# Patient Record
Sex: MALE | Race: WHITE | Employment: OTHER | URBAN - METROPOLITAN AREA
[De-identification: names, ages, dates, MRNs, and addresses within clinical notes are randomized per-mention and may not be internally consistent; named-entity substitution may affect disease eponyms.]

---

## 2018-02-16 RX ORDER — METFORMIN HYDROCHLORIDE 500 MG/1
1000 TABLET ORAL 2 TIMES DAILY
COMMUNITY

## 2018-02-16 RX ORDER — CHOLECALCIFEROL (VITAMIN D3) 125 MCG
5000 CAPSULE ORAL DAILY
COMMUNITY

## 2018-02-19 ENCOUNTER — ANESTHESIA EVENT (OUTPATIENT)
Dept: ENDOSCOPY | Age: 68
End: 2018-02-19
Payer: MEDICARE

## 2018-02-19 ENCOUNTER — ANESTHESIA (OUTPATIENT)
Dept: ENDOSCOPY | Age: 68
End: 2018-02-19
Payer: MEDICARE

## 2018-02-19 ENCOUNTER — HOSPITAL ENCOUNTER (OUTPATIENT)
Age: 68
Setting detail: OUTPATIENT SURGERY
Discharge: HOME | End: 2018-02-19
Attending: INTERNAL MEDICINE | Admitting: INTERNAL MEDICINE
Payer: MEDICARE

## 2018-02-19 VITALS
WEIGHT: 230 LBS | SYSTOLIC BLOOD PRESSURE: 127 MMHG | HEART RATE: 69 BPM | DIASTOLIC BLOOD PRESSURE: 82 MMHG | OXYGEN SATURATION: 96 % | HEIGHT: 73 IN | TEMPERATURE: 96 F | BODY MASS INDEX: 30.48 KG/M2 | RESPIRATION RATE: 20 BRPM

## 2018-02-19 PROCEDURE — 77030009426 HC FCPS BIOP ENDOSC BSC -B: Performed by: INTERNAL MEDICINE

## 2018-02-19 PROCEDURE — 88305 TISSUE EXAM BY PATHOLOGIST: CPT | Performed by: INTERNAL MEDICINE

## 2018-02-19 PROCEDURE — 77030027957 HC TBNG IRR ENDOGTR BUSS -B: Performed by: INTERNAL MEDICINE

## 2018-02-19 PROCEDURE — 76060000031 HC ANESTHESIA FIRST 0.5 HR: Performed by: INTERNAL MEDICINE

## 2018-02-19 PROCEDURE — 77030011640 HC PAD GRND REM COVD -A: Performed by: INTERNAL MEDICINE

## 2018-02-19 PROCEDURE — 74011250636 HC RX REV CODE- 250/636: Performed by: INTERNAL MEDICINE

## 2018-02-19 PROCEDURE — 74011000250 HC RX REV CODE- 250

## 2018-02-19 PROCEDURE — 74011250636 HC RX REV CODE- 250/636

## 2018-02-19 PROCEDURE — 76040000019: Performed by: INTERNAL MEDICINE

## 2018-02-19 RX ORDER — DEXTROMETHORPHAN/PSEUDOEPHED 2.5-7.5/.8
1.2 DROPS ORAL
Status: DISCONTINUED | OUTPATIENT
Start: 2018-02-19 | End: 2018-02-19 | Stop reason: HOSPADM

## 2018-02-19 RX ORDER — PROPOFOL 10 MG/ML
INJECTION, EMULSION INTRAVENOUS AS NEEDED
Status: DISCONTINUED | OUTPATIENT
Start: 2018-02-19 | End: 2018-02-19 | Stop reason: HOSPADM

## 2018-02-19 RX ORDER — NALOXONE HYDROCHLORIDE 0.4 MG/ML
0.4 INJECTION, SOLUTION INTRAMUSCULAR; INTRAVENOUS; SUBCUTANEOUS
Status: DISCONTINUED | OUTPATIENT
Start: 2018-02-19 | End: 2018-02-19 | Stop reason: HOSPADM

## 2018-02-19 RX ORDER — MIDAZOLAM HYDROCHLORIDE 1 MG/ML
.25-1 INJECTION, SOLUTION INTRAMUSCULAR; INTRAVENOUS
Status: DISCONTINUED | OUTPATIENT
Start: 2018-02-19 | End: 2018-02-19 | Stop reason: HOSPADM

## 2018-02-19 RX ORDER — SODIUM CHLORIDE 0.9 % (FLUSH) 0.9 %
5-10 SYRINGE (ML) INJECTION AS NEEDED
Status: DISCONTINUED | OUTPATIENT
Start: 2018-02-19 | End: 2018-02-19 | Stop reason: HOSPADM

## 2018-02-19 RX ORDER — EPINEPHRINE 0.1 MG/ML
1 INJECTION INTRACARDIAC; INTRAVENOUS
Status: DISCONTINUED | OUTPATIENT
Start: 2018-02-19 | End: 2018-02-19 | Stop reason: HOSPADM

## 2018-02-19 RX ORDER — LIDOCAINE HYDROCHLORIDE 20 MG/ML
INJECTION, SOLUTION EPIDURAL; INFILTRATION; INTRACAUDAL; PERINEURAL AS NEEDED
Status: DISCONTINUED | OUTPATIENT
Start: 2018-02-19 | End: 2018-02-19 | Stop reason: HOSPADM

## 2018-02-19 RX ORDER — ATROPINE SULFATE 0.1 MG/ML
0.5 INJECTION INTRAVENOUS
Status: DISCONTINUED | OUTPATIENT
Start: 2018-02-19 | End: 2018-02-19 | Stop reason: HOSPADM

## 2018-02-19 RX ORDER — SODIUM CHLORIDE 9 MG/ML
INJECTION, SOLUTION INTRAVENOUS
Status: DISCONTINUED | OUTPATIENT
Start: 2018-02-19 | End: 2018-02-19 | Stop reason: HOSPADM

## 2018-02-19 RX ORDER — FENTANYL CITRATE 50 UG/ML
200 INJECTION, SOLUTION INTRAMUSCULAR; INTRAVENOUS
Status: DISCONTINUED | OUTPATIENT
Start: 2018-02-19 | End: 2018-02-19 | Stop reason: HOSPADM

## 2018-02-19 RX ORDER — FLUMAZENIL 0.1 MG/ML
0.2 INJECTION INTRAVENOUS
Status: DISCONTINUED | OUTPATIENT
Start: 2018-02-19 | End: 2018-02-19 | Stop reason: HOSPADM

## 2018-02-19 RX ORDER — SODIUM CHLORIDE 0.9 % (FLUSH) 0.9 %
5-10 SYRINGE (ML) INJECTION EVERY 8 HOURS
Status: DISCONTINUED | OUTPATIENT
Start: 2018-02-19 | End: 2018-02-19 | Stop reason: HOSPADM

## 2018-02-19 RX ORDER — SODIUM CHLORIDE 9 MG/ML
100 INJECTION, SOLUTION INTRAVENOUS CONTINUOUS
Status: DISCONTINUED | OUTPATIENT
Start: 2018-02-19 | End: 2018-02-19 | Stop reason: HOSPADM

## 2018-02-19 RX ADMIN — PROPOFOL 40 MG: 10 INJECTION, EMULSION INTRAVENOUS at 11:12

## 2018-02-19 RX ADMIN — LIDOCAINE HYDROCHLORIDE 100 MG: 20 INJECTION, SOLUTION EPIDURAL; INFILTRATION; INTRACAUDAL; PERINEURAL at 11:09

## 2018-02-19 RX ADMIN — PROPOFOL 50 MG: 10 INJECTION, EMULSION INTRAVENOUS at 11:25

## 2018-02-19 RX ADMIN — SODIUM CHLORIDE: 9 INJECTION, SOLUTION INTRAVENOUS at 11:07

## 2018-02-19 RX ADMIN — PROPOFOL 30 MG: 10 INJECTION, EMULSION INTRAVENOUS at 11:20

## 2018-02-19 RX ADMIN — PROPOFOL 80 MG: 10 INJECTION, EMULSION INTRAVENOUS at 11:09

## 2018-02-19 RX ADMIN — PROPOFOL 40 MG: 10 INJECTION, EMULSION INTRAVENOUS at 11:15

## 2018-02-19 RX ADMIN — PROPOFOL 10 MG: 10 INJECTION, EMULSION INTRAVENOUS at 11:22

## 2018-02-19 NOTE — ANESTHESIA POSTPROCEDURE EVALUATION
Post-Anesthesia Evaluation and Assessment    Patient: Fabienne Kaplan MRN: [id number]  SSN: [id number]    YOB: 1950  Age: 79 y.o. Sex: male       Cardiovascular Function/Vital Signs  Visit Vitals    /82    Pulse 69    Temp 35.6 °C (96 °F)    Resp 20    Ht 6' 1\" (1.854 m)    Wt 104.3 kg (230 lb)    SpO2 96%    BMI 30.34 kg/m2       Patient is status post MAC anesthesia for Procedure(s):  COLONOSCOPY  ENDOSCOPIC POLYPECTOMY. Nausea/Vomiting: None    Postoperative hydration reviewed and adequate. Pain:  Pain Scale 1: Numeric (0 - 10) (02/19/18 1152)  Pain Intensity 1: 0 (02/19/18 1152)   Managed    Neurological Status: At baseline    Mental Status and Level of Consciousness: Arousable    Pulmonary Status:   O2 Device: Room air (02/19/18 1152)   Adequate oxygenation and airway patent    Complications related to anesthesia: None    Post-anesthesia assessment completed.  No concerns    Signed By: Fuentes Pretty MD     February 19, 2018

## 2018-02-19 NOTE — ANESTHESIA PREPROCEDURE EVALUATION
Anesthetic History   No history of anesthetic complications            Review of Systems / Medical History  Patient summary reviewed, nursing notes reviewed and pertinent labs reviewed    Pulmonary          Smoker         Neuro/Psych   Within defined limits           Cardiovascular  Within defined limits                     GI/Hepatic/Renal  Within defined limits              Endo/Other    Diabetes         Other Findings            Physical Exam    Airway  Mallampati: II  TM Distance: > 6 cm  Neck ROM: normal range of motion   Mouth opening: Normal     Cardiovascular  Regular rate and rhythm,  S1 and S2 normal,  no murmur, click, rub, or gallop             Dental  No notable dental hx       Pulmonary  Breath sounds clear to auscultation               Abdominal  GI exam deferred       Other Findings            Anesthetic Plan    ASA: 2  Anesthesia type: MAC          Induction: Intravenous  Anesthetic plan and risks discussed with: Patient

## 2018-02-19 NOTE — H&P
[address]   [facility], [address] [place] [address]      History and Physical       NAME:  Shalini Kemp   :   1950   MRN:   [id number]             History of Present Illness:  Patient is a 79 y.o. who is seen for screening colonoscopy. PMH:  History reviewed. No pertinent past medical history. PSH:  Past Surgical History:   Procedure Laterality Date    HX APPENDECTOMY      HX TONSILLECTOMY         Allergies:  No Known Allergies    Home Medications:  Prior to Admission Medications   Prescriptions Last Dose Informant Patient Reported? Taking? ASPIRIN PO 2018  Yes Yes   Sig: Take 81 mg by mouth daily. DOCOSAHEXANOIC ACID/EPA (FISH OIL PO) 2018  Yes Yes   Sig: Take 2,400 mg by mouth two (2) times a day. OTHER 2018  Yes Yes   Sig: Methyl B12 600 mcg po once daily. RED YEAST RICE PO 2018  Yes Yes   Sig: Take 900 mg by mouth daily. cholecalciferol (VITAMIN D3) 5,000 unit capsule 2018 at Unknown time  Yes Yes   Sig: Take 5,000 Units by mouth daily. metFORMIN (GLUCOPHAGE) 500 mg tablet 2018  Yes Yes   Sig: Take 1,000 mg by mouth two (2) times a day.       Facility-Administered Medications: None       Hospital Medications:  Current Facility-Administered Medications   Medication Dose Route Frequency    0.9% sodium chloride infusion  100 mL/hr IntraVENous CONTINUOUS    sodium chloride (NS) flush 5-10 mL  5-10 mL IntraVENous Q8H    sodium chloride (NS) flush 5-10 mL  5-10 mL IntraVENous PRN    midazolam (VERSED) injection 0.25-10 mg  0.25-10 mg IntraVENous Multiple    fentaNYL citrate (PF) injection 200 mcg  200 mcg IntraVENous Multiple    naloxone (NARCAN) injection 0.4 mg  0.4 mg IntraVENous Multiple    flumazenil (ROMAZICON) 0.1 mg/mL injection 0.2 mg  0.2 mg IntraVENous Multiple    simethicone (MYLICON) 70MN/5.0YI oral drops 80 mg  1.2 mL Oral Multiple    atropine injection 0.5 mg  0.5 mg IntraVENous ONCE PRN    EPINEPHrine (ADRENALIN) 0.1 mg/mL syringe 1 mg  1 mg Endoscopically ONCE PRN       Social History:  Social History   Substance Use Topics    Smoking status: Current Some Day Smoker    Smokeless tobacco: Never Used      Comment: occasional cigar    Alcohol use No       Family History:  History reviewed. No pertinent family history. Review of Systems:      Constitutional: negative fever, negative chills, negative weight loss  Eyes:   negative visual changes  ENT:   negative sore throat, tongue or lip swelling  Respiratory:  negative cough, negative dyspnea  Cards:  negative for chest pain, palpitations, lower extremity edema  GI:   See HPI  :  negative for frequency, dysuria  Integument:  negative for rash and pruritus  Heme:  negative for easy bruising and gum/nose bleeding  Musculoskel: negative for myalgias,  back pain and muscle weakness  Neuro: negative for headaches, dizziness, vertigo  Psych:  negative for feelings of anxiety, depression       Objective:   Patient Vitals for the past 8 hrs:   BP Pulse Resp SpO2 Height Weight   02/19/18 0936 118/78 76 16 98 % 6' 1\" (1.854 m) 104.3 kg (230 lb)             EXAM:     NEURO-a&o   HEENT-wnl   LUNGS-clear    COR-regular rate and rhythym     ABD-soft , no tenderness, no rebound, good bs     EXT-no edema     Data Review     No results for input(s): WBC, HGB, HCT, PLT, HGBEXT, HCTEXT, PLTEXT in the last 72 hours. No results for input(s): NA, K, CL, CO2, BUN, CREA, GLU, PHOS, CA in the last 72 hours. No results for input(s): SGOT, GPT, AP, TBIL, TP, ALB, GLOB, GGT, AML, LPSE in the last 72 hours. No lab exists for component: AMYP, HLPSE  No results for input(s): INR, PTP, APTT in the last 72 hours. No lab exists for component: INREXT       Assessment:   · screening colonoscopy   There is no problem list on file for this patient.           Plan:   · Endoscopic procedure with sedation     Signed By: Cassandra Frye MD     2/19/2018  11:06 AM

## 2018-02-19 NOTE — PROCEDURES
[address]  [address] [facility], [address] [place] [address]      Colonoscopy Operative Report    Gladis Elias  [id number]  1950      Procedure Type:   Colonoscopy with polypectomy (hot biopsy)     Indications:    Screening colonoscopy   Date of last colonoscopy: 10 years ago, Polyps  No    Pre-operative Diagnosis: see indication above    Post-operative Diagnosis:  See findings below    :  Leighton Can MD      Referring Provider: Batsheva Hernandez MD      Sedation:  MAC anesthesia Propofol      Procedure Details:  After informed consent was obtained with all risks and benefits of procedure explained and preoperative exam completed, the patient was taken to the endoscopy suite and placed in the left lateral decubitus position. Upon sequential sedation as per above, a digital rectal exam was performed demonstrating internal hemorrhoids. The Olympus videocolonoscope  was inserted in the rectum and carefully advanced to the cecum, which was identified by the ileocecal valve and appendiceal orifice. The cecum was identified by the ileocecal valve and appendiceal orifice. The quality of preparation was good. The colonoscope was slowly withdrawn with careful evaluation between folds. Retroflexion in the rectum was completed . Findings:   Rectum: normal  Sigmoid: 8 mm sessile polyp removed by hot biopsy  Descending Colon: normal  Transverse Colon: normal  9 mm sessile polyp removed by hot biopsy  Ascending Colon: normal  Cecum: normal  Mild diverticulosis seen throughout the colon, including the cecum      Specimen Removed:  none    Complications: None. EBL:  None. Impression:    see findings    Recommendations: --Repeat colonoscopy in 5 vs 10  Years depending on the polyp's pathology.       High fiber diet    Signed By: Leighton Can MD     2/19/2018  11:31 AM

## 2018-02-19 NOTE — IP AVS SNAPSHOT
[address] [facility] [address] [place] [address] 
[phone] Patient: Amee Meza MRN: [id number] AOX:92/88/1067 About your hospitalization You were admitted on:  February 19, 2018 You last received care in the:  [facility] ENDOSCOPY You were discharged on:  February 19, 2018 Why you were hospitalized Your primary diagnosis was:  Not on File Follow-up Information None Discharge Orders None A check crystal indicates which time of day the medication should be taken. My Medications CONTINUE taking these medications Instructions Each Dose to Equal  
 Morning Noon Evening Bedtime ASPIRIN PO Your last dose was: Your next dose is: Take 81 mg by mouth daily. 81 mg  
    
   
   
   
  
 cholecalciferol 5,000 unit capsule Commonly known as:  VITAMIN D3 Your last dose was: Your next dose is: Take 5,000 Units by mouth daily. 5000 Units FISH OIL PO Your last dose was: Your next dose is: Take 2,400 mg by mouth two (2) times a day. 2400 mg  
    
   
   
   
  
 metFORMIN 500 mg tablet Commonly known as:  GLUCOPHAGE Your last dose was: Your next dose is: Take 1,000 mg by mouth two (2) times a day. 1000 mg  
    
   
   
   
  
 OTHER Your last dose was: Your next dose is: Methyl B12 600 mcg po once daily. RED YEAST RICE PO Your last dose was: Your next dose is: Take 900 mg by mouth daily. 900 mg Discharge Instructions [address] 
[address], [address] COLON DISCHARGE INSTRUCTIONS Amee Meza 
[id number] 
1950 Discomfort: 
Redness at IV site- apply warm compress to area; if redness or soreness persist- contact your physician There may be a slight amount of blood passed from the rectum Gaseous discomfort- walking, belching will help relieve any discomfort You may not operate a vehicle for 12 hours You may not engage in an occupation involving machinery or appliances for rest of today You may not drink alcoholic beverages for at least 12 hours Avoid making any critical decisions for at least 24 hour DIET: 
You may resume your regular diet  however -  remember your colon is empty and a heavy meal will produce gas. Avoid these foods:  vegetables, fried / greasy foods, carbonated drinks ACTIVITY: 
You may  resume your normal daily activities it is recommended that you spend the remainder of the day resting -  avoid any strenuous activity. CALL M.D. ANY SIGN OF: Increasing pain, nausea, vomiting Abdominal distension (swelling) New increased bleeding (oral or rectal) Fever (chills) Pain in chest area Shortness of breath Follow-up Instructions: 
 Call Dr. Phuong Arredondo for any questions or problems at [phone] High fiber diet ENDOSCOPY FINDINGS: 
 Your colonoscopy showed  2 small polyps I removed, and mild diverticulosis. Telephone # [phone] Signed By: Phuong Arredondo MD   
 2/19/2018  11:34 AM 
  
 
DISCHARGE SUMMARY from Nurse The following personal items collected during your admission are returned to you:  
Dental Appliance:   
Vision: Visual Aid: Glasses Hearing Aid:   
Jewelry:   
Clothing:   
Other Valuables:   
Valuables sent to safe:   
 
 
 
  
  
  
Introducing hospitals & HEALTH SERVICES! Sharon Nguyen introduces [employer] patient portal. Now you can access parts of your medical record, email your doctor's office, and request medication refills online. 1. In your internet browser, go to [email or web address] 2. Click on the First Time User? Click Here link in the Sign In box. You will see the New Member Sign Up page. 3. Enter your Sailthru Access Code exactly as it appears below. You will not need to use this code after youve completed the sign-up process. If you do not sign up before the expiration date, you must request a new code. · Sailthru Access Code: [email or web address] Expires: 5/20/2018 11:45 AM 
 
4. Enter the last four digits of your Social Security Number (xxxx) and Date of Birth (mm/dd/yyyy) as indicated and click Submit. You will be taken to the next sign-up page. 5. Create a WinningAdvantaget ID. This will be your Sailthru login ID and cannot be changed, so think of one that is secure and easy to remember. 6. Create a Sailthru password. You can change your password at any time. 7. Enter your Password Reset Question and Answer. This can be used at a later time if you forget your password. 8. Enter your e-mail address. You will receive e-mail notification when new information is available in [address] 9. Click Sign Up. You can now view and download portions of your medical record. 10. Click the Download Summary menu link to download a portable copy of your medical information. If you have questions, please visit the Frequently Asked Questions section of the Sailthru website. Remember, Sailthru is NOT to be used for urgent needs. For medical emergencies, dial 911. Now available from your iPhone and Android! Providers Seen During Your Hospitalization Provider Specialty Primary office phone Selene Harris MD Gastroenterology [phone] Your Primary Care Physician (PCP) Primary Care Physician Office Phone Office Fax Kyree Devonte [phone] You are allergic to the following No active allergies Recent Documentation Height Weight BMI Smoking Status 1.854 m 104.3 kg 30.34 kg/m2 Current Some Day Smoker Emergency Contacts Name Discharge Info Relation Home Work Mobile Boile 78 CAREGIVER [3] Spouse [3] [phone] Patient Belongings The following personal items are in your possession at time of discharge: 
     Visual Aid: Glasses Please provide this summary of care documentation to your next provider. Signatures-by signing, you are acknowledging that this After Visit Summary has been reviewed with you and you have received a copy. Patient Signature:  ____________________________________________________________ Date:  ____________________________________________________________  
  
Haleigh Sleeper Provider Signature:  ____________________________________________________________ Date:  ____________________________________________________________

## 2018-02-19 NOTE — ROUTINE PROCESS
[facility]  1950  [id number]    Situation:  Verbal report received from: Yasmeen RN  Procedure: Procedure(s):  COLONOSCOPY  ENDOSCOPIC POLYPECTOMY    Background:    Preoperative diagnosis: POLYPS  Postoperative diagnosis: diverticulosis, colon polyps    :  Dr. Tess Montez  Assistant(s): Endoscopy Technician-1: Wilfredo Or  Endoscopy RN-1: Alondra Pemberton RN    Specimens:   ID Type Source Tests Collected by Time Destination   1 : transverse colon polyp Preservative   Lynn Crump MD 2/19/2018 1122 Pathology   2 : sigmoid colon polyp Preservative   Sotero Garner MD 2/19/2018 1125 Pathology     H. Pylori  no    Assessment:  Intra-procedure medications   Anesthesia gave intra-procedure sedation and medications, see anesthesia flow sheet yes    Intravenous fluids: NS@ KVO     Vital signs stable     Abdominal assessment: round and soft     Recommendation:  Discharge patient per MD order.     Family or Friend   Permission to share finding with family or friend yes

## 2018-02-19 NOTE — DISCHARGE INSTRUCTIONS
[facility]    [place] DISCHARGE INSTRUCTIONS    Corona Paris  [id number]  1950    Discomfort:  Redness at IV site- apply warm compress to area; if redness or soreness persist- contact your physician  There may be a slight amount of blood passed from the rectum  Gaseous discomfort- walking, belching will help relieve any discomfort  You may not operate a vehicle for 12 hours  You may not engage in an occupation involving machinery or appliances for rest of today  You may not drink alcoholic beverages for at least 12 hours  Avoid making any critical decisions for at least 24 hour  DIET:  You may resume your regular diet - however -  remember your colon is empty and a heavy meal will produce gas. Avoid these foods:  vegetables, fried / greasy foods, carbonated drinks     ACTIVITY:  You may  resume your normal daily activities it is recommended that you spend the remainder of the day resting -  avoid any strenuous activity. CALL M.D. ANY SIGN OF:   Increasing pain, nausea, vomiting  Abdominal distension (swelling)  New increased bleeding (oral or rectal)  Fever (chills)  Pain in chest area    Shortness of breath      Follow-up Instructions:   Call Dr. Jass Oreilly for any questions or problems at [phone]  High fiber diet          ENDOSCOPY FINDINGS:   Your colonoscopy showed  2 small polyps I removed, and mild diverticulosis.   Telephone # [phone]      Signed By: Jass Oreilly MD     2/19/2018  11:34 AM       DISCHARGE SUMMARY from Nurse    The following personal items collected during your admission are returned to you:   Dental Appliance:    Vision: Visual Aid: Glasses  Hearing Aid:    Jewelry:    Clothing:    Other Valuables:    Valuables sent to safe:

## 2022-10-10 ENCOUNTER — OFFICE VISIT (OUTPATIENT)
Dept: ORTHOPEDIC SURGERY | Age: 72
End: 2022-10-10
Payer: MEDICARE

## 2022-10-10 VITALS — WEIGHT: 215 LBS | HEIGHT: 72 IN | BODY MASS INDEX: 29.12 KG/M2

## 2022-10-10 DIAGNOSIS — M17.0 PRIMARY OSTEOARTHRITIS OF BOTH KNEES: ICD-10-CM

## 2022-10-10 DIAGNOSIS — M25.561 ACUTE PAIN OF RIGHT KNEE: Primary | ICD-10-CM

## 2022-10-10 PROCEDURE — 99203 OFFICE O/P NEW LOW 30 MIN: CPT | Performed by: ORTHOPAEDIC SURGERY

## 2022-10-10 PROCEDURE — G8536 NO DOC ELDER MAL SCRN: HCPCS | Performed by: ORTHOPAEDIC SURGERY

## 2022-10-10 PROCEDURE — 1123F ACP DISCUSS/DSCN MKR DOCD: CPT | Performed by: ORTHOPAEDIC SURGERY

## 2022-10-10 PROCEDURE — 1101F PT FALLS ASSESS-DOCD LE1/YR: CPT | Performed by: ORTHOPAEDIC SURGERY

## 2022-10-10 PROCEDURE — 3017F COLORECTAL CA SCREEN DOC REV: CPT | Performed by: ORTHOPAEDIC SURGERY

## 2022-10-10 PROCEDURE — G8419 CALC BMI OUT NRM PARAM NOF/U: HCPCS | Performed by: ORTHOPAEDIC SURGERY

## 2022-10-10 PROCEDURE — G8427 DOCREV CUR MEDS BY ELIG CLIN: HCPCS | Performed by: ORTHOPAEDIC SURGERY

## 2022-10-10 PROCEDURE — G8432 DEP SCR NOT DOC, RNG: HCPCS | Performed by: ORTHOPAEDIC SURGERY

## 2022-10-10 NOTE — PROGRESS NOTES
Marielle Sargent (: 1950) is a 70 y.o. male, patient, here for evaluation of the following chief complaint(s):  Knee Pain (Right knee pain)       HPI:    Patient presents for evaluation of his right knee pain. States that pain is been present for the last couple years. Primary care provider diagnosed with right knee osteoarthritis. She underwent a corticosteroid injection approximately 3 weeks ago. He states that his pain has resolved significantly since that injection. Feels like his primary concern right now is an abnormality within his gait. Feels like his right leg is slightly longer than his left. States that after walking for prolonged periods he has medial sided knee pain that forces him to sit. No Known Allergies    Current Outpatient Medications   Medication Sig    metFORMIN (GLUCOPHAGE) 500 mg tablet Take 1,000 mg by mouth two (2) times a day. RED YEAST RICE PO Take 900 mg by mouth daily. ASPIRIN PO Take 81 mg by mouth daily. cholecalciferol (VITAMIN D3) 5,000 unit capsule Take 5,000 Units by mouth daily. OTHER Methyl B12 600 mcg po once daily. DOCOSAHEXANOIC ACID/EPA (FISH OIL PO) Take 2,400 mg by mouth two (2) times a day. No current facility-administered medications for this visit. No past medical history on file. Past Surgical History:   Procedure Laterality Date    COLONOSCOPY N/A 2018    COLONOSCOPY performed by Ernie Watson MD at [facility] ENDOSCOPY    HX APPENDECTOMY      HX TONSILLECTOMY         No family history on file.      Social History     Socioeconomic History    Marital status:      Spouse name: Not on file    Number of children: Not on file    Years of education: Not on file    Highest education level: Not on file   Occupational History    Not on file   Tobacco Use    Smoking status: Some Days    Smokeless tobacco: Never    Tobacco comments:     occasional cigar   Substance and Sexual Activity    Alcohol use: No    Drug use: Not on file    Sexual activity: Not on file   Other Topics Concern    Not on file   Social History Narrative    Not on file     Social Determinants of Health     Financial Resource Strain: Not on file   Food Insecurity: Not on file   Transportation Needs: Not on file   Physical Activity: Not on file   Stress: Not on file   Social Connections: Not on file   Intimate Partner Violence: Not on file   Housing Stability: Not on file       Review of Systems   Musculoskeletal:         Right knee pain       Vitals: There were no vitals taken for this visit. There is no height or weight on file to calculate BMI. Ortho Exam     General: No acute distress, alert and oriented x3. Right knee: Mild varus deformity noted with the knee. There is small effusion within the knee. No ecchymosis, erythema throughout the knee. Range of motion from 0 to 120 degrees. Tenderness palpation of the medial and lateral joint lines. Crepitance with extension of the knee. Stable to varus and valgus stress. Negative Lachman, negative posterior drawer. José's maneuver is negative. Distally the extremity is neurovascular intact. Left knee: There is no effusion noted. There is no deformity, ecchymosis, erythema or abrasions about the knee. There is pain-free range of motion from 0 to 130 degrees. No crepitance is noted, patient is able to straight leg raise. There is no tenderness to palpation at the medial or lateral joint lines. No tenderness to palpation along the extensor mechanism. Negative José's exam.  There is a negative Lachman's exam, negative anterior and posterior drawer tests. Knee is stable to varus and valgus stress at 0 and 30 degrees. Distally the extremity is neurovascularly intact. X-rays of the right knee demonstrate osteoarthritic change with bone-on-bone arthritis on the medial compartment. No evidence of fracture or dislocation.         XR Results (most recent):  Results from Appointment encounter on 10/10/22    XR KNEE RT MIN 4 V    Narrative  4 view x-ray of both knees reveals significant osteoarthritis of both knees with medial compartment bone-on-bone arthritis. ASSESSMENT/PLAN:    Patient is osteoarthritic right knee that is causing both his pain as well as the abnormality within his gait. He is starting to progress into his touch of varus causing his gait abnormality as well. His primary care provider is appropriately been treating him with corticosteroid injections that have provided him significant relief. I think this is reasonable to continue doing. We discussed definitive management for his issue would be total knee replacement. He does not wish to discuss surgical options at this time given the function of his corticosteroid injections. Happy to see him back on an as-needed basis regarding his right knee.         Annalee Sharp MD

## 2022-10-10 NOTE — LETTER
10/10/2022    Patient: Charly Shi   YOB: 1950   Date of Visit: 10/10/2022     Darshan Soto MD  [address] [place] [id number]  Via Fax: [phone]    Dear Darshan Soto MD,      Thank you for referring Mr. Flor Luna to [facility] for evaluation. My notes for this consultation are attached. If you have questions, please do not hesitate to call me. I look forward to following your patient along with you.       Sincerely,    Ignacio Reagan MD

## 2023-09-26 ENCOUNTER — HOSPITAL ENCOUNTER (OUTPATIENT)
Facility: HOSPITAL | Age: 73
Setting detail: OUTPATIENT SURGERY
Discharge: HOME | End: 2023-09-26
Attending: INTERNAL MEDICINE | Admitting: INTERNAL MEDICINE
Payer: MEDICARE

## 2023-09-26 ENCOUNTER — ANESTHESIA (OUTPATIENT)
Facility: HOSPITAL | Age: 73
End: 2023-09-26
Payer: MEDICARE

## 2023-09-26 ENCOUNTER — ANESTHESIA EVENT (OUTPATIENT)
Facility: HOSPITAL | Age: 73
End: 2023-09-26
Payer: MEDICARE

## 2023-09-26 VITALS
HEART RATE: 71 BPM | WEIGHT: 215 LBS | BODY MASS INDEX: 29.12 KG/M2 | DIASTOLIC BLOOD PRESSURE: 71 MMHG | OXYGEN SATURATION: 95 % | HEIGHT: 72 IN | RESPIRATION RATE: 11 BRPM | SYSTOLIC BLOOD PRESSURE: 103 MMHG | TEMPERATURE: 97.5 F

## 2023-09-26 PROCEDURE — 2500000003 HC RX 250 WO HCPCS: Performed by: NURSE ANESTHETIST, CERTIFIED REGISTERED

## 2023-09-26 PROCEDURE — 3700000001 HC ADD 15 MINUTES (ANESTHESIA): Performed by: INTERNAL MEDICINE

## 2023-09-26 PROCEDURE — 6360000002 HC RX W HCPCS: Performed by: NURSE ANESTHETIST, CERTIFIED REGISTERED

## 2023-09-26 PROCEDURE — 3600007502: Performed by: INTERNAL MEDICINE

## 2023-09-26 PROCEDURE — 6370000000 HC RX 637 (ALT 250 FOR IP): Performed by: INTERNAL MEDICINE

## 2023-09-26 PROCEDURE — 2580000003 HC RX 258: Performed by: NURSE ANESTHETIST, CERTIFIED REGISTERED

## 2023-09-26 PROCEDURE — 7100000011 HC PHASE II RECOVERY - ADDTL 15 MIN: Performed by: INTERNAL MEDICINE

## 2023-09-26 PROCEDURE — 2709999900 HC NON-CHARGEABLE SUPPLY: Performed by: INTERNAL MEDICINE

## 2023-09-26 PROCEDURE — 3700000000 HC ANESTHESIA ATTENDED CARE: Performed by: INTERNAL MEDICINE

## 2023-09-26 PROCEDURE — 3600007512: Performed by: INTERNAL MEDICINE

## 2023-09-26 PROCEDURE — 88305 TISSUE EXAM BY PATHOLOGIST: CPT

## 2023-09-26 PROCEDURE — 7100000010 HC PHASE II RECOVERY - FIRST 15 MIN: Performed by: INTERNAL MEDICINE

## 2023-09-26 RX ORDER — SIMETHICONE 20 MG/.3ML
EMULSION ORAL PRN
Status: DISCONTINUED | OUTPATIENT
Start: 2023-09-26 | End: 2023-09-26 | Stop reason: ALTCHOICE

## 2023-09-26 RX ORDER — SIMETHICONE 20 MG/.3ML
EMULSION ORAL
Status: DISCONTINUED
Start: 2023-09-26 | End: 2023-09-26 | Stop reason: HOSPADM

## 2023-09-26 RX ORDER — SODIUM CHLORIDE 9 MG/ML
INJECTION, SOLUTION INTRAVENOUS CONTINUOUS PRN
Status: DISCONTINUED | OUTPATIENT
Start: 2023-09-26 | End: 2023-09-26 | Stop reason: SDUPTHER

## 2023-09-26 RX ORDER — LIDOCAINE HYDROCHLORIDE 20 MG/ML
INJECTION, SOLUTION EPIDURAL; INFILTRATION; INTRACAUDAL; PERINEURAL PRN
Status: DISCONTINUED | OUTPATIENT
Start: 2023-09-26 | End: 2023-09-26 | Stop reason: SDUPTHER

## 2023-09-26 RX ORDER — SODIUM CHLORIDE 0.9 % (FLUSH) 0.9 %
5-40 SYRINGE (ML) INJECTION PRN
Status: DISCONTINUED | OUTPATIENT
Start: 2023-09-26 | End: 2023-09-26 | Stop reason: HOSPADM

## 2023-09-26 RX ORDER — LIDOCAINE HYDROCHLORIDE 20 MG/ML
INJECTION, SOLUTION EPIDURAL; INFILTRATION; INTRACAUDAL; PERINEURAL
Status: COMPLETED
Start: 2023-09-26 | End: 2023-09-26

## 2023-09-26 RX ORDER — PROPOFOL 10 MG/ML
INJECTION, EMULSION INTRAVENOUS
Status: COMPLETED
Start: 2023-09-26 | End: 2023-09-26

## 2023-09-26 RX ORDER — SODIUM CHLORIDE 0.9 % (FLUSH) 0.9 %
5-40 SYRINGE (ML) INJECTION EVERY 12 HOURS SCHEDULED
Status: DISCONTINUED | OUTPATIENT
Start: 2023-09-26 | End: 2023-09-26 | Stop reason: HOSPADM

## 2023-09-26 RX ORDER — CHLORAL HYDRATE 500 MG
CAPSULE ORAL DAILY
COMMUNITY

## 2023-09-26 RX ORDER — VIT C/B6/B5/MAGNESIUM/HERB 173 50-5-6-5MG
500 CAPSULE ORAL DAILY
COMMUNITY

## 2023-09-26 RX ORDER — SODIUM CHLORIDE 9 MG/ML
25 INJECTION, SOLUTION INTRAVENOUS PRN
Status: DISCONTINUED | OUTPATIENT
Start: 2023-09-26 | End: 2023-09-26 | Stop reason: HOSPADM

## 2023-09-26 RX ORDER — SODIUM CHLORIDE 9 MG/ML
INJECTION, SOLUTION INTRAVENOUS CONTINUOUS
Status: DISCONTINUED | OUTPATIENT
Start: 2023-09-26 | End: 2023-09-26 | Stop reason: HOSPADM

## 2023-09-26 RX ADMIN — PROPOFOL 40 MG: 10 INJECTION, EMULSION INTRAVENOUS at 08:07

## 2023-09-26 RX ADMIN — PROPOFOL 100 MG: 10 INJECTION, EMULSION INTRAVENOUS at 08:00

## 2023-09-26 RX ADMIN — PROPOFOL 20 MG: 10 INJECTION, EMULSION INTRAVENOUS at 08:14

## 2023-09-26 RX ADMIN — LIDOCAINE HYDROCHLORIDE 60 MG: 20 INJECTION, SOLUTION EPIDURAL; INFILTRATION; INTRACAUDAL; PERINEURAL at 08:01

## 2023-09-26 RX ADMIN — PROPOFOL 40 MG: 10 INJECTION, EMULSION INTRAVENOUS at 08:12

## 2023-09-26 RX ADMIN — SODIUM CHLORIDE: 9 INJECTION, SOLUTION INTRAVENOUS at 07:58

## 2023-09-26 NOTE — H&P
input(s): \"INR\", \"APTT\" in the last 72 hours. Invalid input(s): \"PTP\"       Assessment:     H/o colon polyps    There is no problem list on file for this patient.         Plan:     Endoscopic procedure with sedation     Signed By: Rahul Nguyen MD     9/26/2023  7:50 AM

## 2023-09-26 NOTE — ANESTHESIA POSTPROCEDURE EVALUATION
Department of Anesthesiology  Postprocedure Note    Patient: Nicole Monsalve  MRN: [id number]  YOB: 1950  Date of evaluation: 9/26/2023      Procedure Summary     Date: 09/26/23 Room / Location: [facility] ENDO [address] [place] [address] ENDOSCOPY    Anesthesia Start: 7034 Anesthesia Stop: 0815    Procedure: COLONOSCOPY Diagnosis:       Personal history of colonic polyps      (Personal history of colonic polyps [Z86.010])    Surgeons: Steve Wagner MD Responsible Provider: Reva Xie MD    Anesthesia Type: MAC ASA Status: 2          Anesthesia Type: MAC    Michaela Phase I: Michaela Score: 10    Michaela Phase II: Michaela Score: 9      Anesthesia Post Evaluation    Patient location during evaluation: PACU  Patient participation: complete - patient participated  Level of consciousness: awake  Pain score: 2  Airway patency: patent  Nausea & Vomiting: no nausea  Complications: no  Cardiovascular status: blood pressure returned to baseline  Respiratory status: acceptable  Hydration status: euvolemic  Pain management: adequate

## 2023-09-26 NOTE — DISCHARGE INSTRUCTIONS
[facility]    COLON DISCHARGE INSTRUCTIONS    Urbano Martinez  [id number]  1950    Discomfort:  Redness at IV site- apply warm compress to area; if redness or soreness persist- contact your physician  There may be a slight amount of blood passed from the rectum  Gaseous discomfort- walking, belching will help relieve any discomfort  You may not operate a vehicle for 12 hours  You may not engage in an occupation involving machinery or appliances for rest of today  You may not drink alcoholic beverages for at least 12 hours  Avoid making any critical decisions for at least 24 hour  DIET:  You may resume your regular diet - however -  remember your colon is empty and a heavy meal will produce gas. Avoid these foods:  vegetables, fried / greasy foods, carbonated drinks     ACTIVITY:  You may  resume your normal daily activities it is recommended that you spend the remainder of the day resting -  avoid any strenuous activity. CALL M.D.   ANY SIGN OF:   Increasing pain, nausea, vomiting  Abdominal distension (swelling)  New increased bleeding (oral or rectal)  Fever (chills)  Pain in chest area  Bloody discharge from nose or mouth  Shortness of breath      Follow-up Instructions:   Call Dr. Kalani Andrea for any questions or problems at [phone]   High fiber diet          ENDOSCOPY FINDINGS:   Your colonoscopy showed one small polyp removed, and diverticulosis, rest of exam was normal .  Telephone # [phone]      Signed By: Kalani Andrea MD     9/26/2023  8:21 AM       DISCHARGE SUMMARY from Nurse    The following personal items collected during your admission are returned to you:   Dental Appliance:    Vision:    Hearing Aid:    Jewelry:    Clothing:    Other Valuables:    Valuables sent to safe: Dose (mL/hr) Propofol : *20 mg      Learning About Coronavirus (COVID-19)  Coronavirus (COVID-19): Overview  What is coronavirus

## 2023-09-26 NOTE — ANESTHESIA PRE PROCEDURE
Ready to quit: Not Answered  Counseling given: Not Answered  Tobacco comments: Quit smoking: occasional cigar      Vital Signs (Current): There were no vitals filed for this visit. BP Readings from Last 3 Encounters:   No data found for BP       NPO Status:                                                                                 BMI:   Wt Readings from Last 3 Encounters:   10/10/22 97.5 kg (215 lb)     There is no height or weight on file to calculate BMI.    CBC: No results found for: \"WBC\", \"RBC\", \"HGB\", \"HCT\", \"MCV\", \"RDW\", \"PLT\"    CMP: No results found for: \"NA\", \"K\", \"CL\", \"CO2\", \"BUN\", \"CREATININE\", \"GFRAA\", \"AGRATIO\", \"LABGLOM\", \"GLUCOSE\", \"GLU\", \"PROT\", \"CALCIUM\", \"BILITOT\", \"ALKPHOS\", \"AST\", \"ALT\"    POC Tests: No results for input(s): \"POCGLU\", \"POCNA\", \"POCK\", \"POCCL\", \"POCBUN\", \"POCHEMO\", \"POCHCT\" in the last 72 hours.     Coags: No results found for: \"PROTIME\", \"INR\", \"APTT\"    HCG (If Applicable): No results found for: \"PREGTESTUR\", \"PREGSERUM\", \"HCG\", \"HCGQUANT\"     ABGs: No results found for: \"PHART\", \"PO2ART\", \"HVV2PLL\", \"BVQ2LEJ\", \"BEART\", \"Z8DHGFBM\"     Type & Screen (If Applicable):  No results found for: \"LABABO\", \"LABRH\"    Drug/Infectious Status (If Applicable):  No results found for: \"HIV\", \"HEPCAB\"    COVID-19 Screening (If Applicable): No results found for: \"COVID19\"        Anesthesia Evaluation  Patient summary reviewed and Nursing notes reviewed  Airway: Mallampati: II  TM distance: >3 FB   Neck ROM: full  Mouth opening: > = 3 FB   Dental:          Pulmonary:Negative Pulmonary ROS breath sounds clear to auscultation                             Cardiovascular:Negative CV ROS            Rhythm: regular  Rate: normal                    Neuro/Psych:   Negative Neuro/Psych ROS              GI/Hepatic/Renal: Neg GI/Hepatic/Renal ROS            Endo/Other: Negative Endo/Other ROS                    Abdominal:             Vascular: negative

## 2023-09-26 NOTE — OP NOTE
[facility]  [address] [place] [address], [address] [facility]      Colonoscopy Operative Report    Jose Guajardo  [id number]  1950      Procedure Type:   Colonoscopy with polypectomy (hot biopsy)     Indications:    Personal history of colon polyps (screening only)       Pre-operative Diagnosis: see indication above    Post-operative Diagnosis:  See findings below    :  Tami Rausch MD    Surgical Assistant: Circulator: Atif Coker RN  Endoscopy Technician: Ed Ordonez    Implants:  None    Referring Provider: Mary Farias MD      Sedation:  MAC anesthesia Propofol      Procedure Details:  After informed consent was obtained with all risks and benefits of procedure explained and preoperative exam completed, the patient was taken to the endoscopy suite and placed in the left lateral decubitus position. Upon sequential sedation as per above, a digital rectal exam was performed demonstrating internal hemorrhoids. The Olympus videocolonoscope  was inserted in the rectum and carefully advanced to the cecum, which was identified by the ileocecal valve and appendiceal orifice. The cecum was identified by the ileocecal valve and appendiceal orifice. The quality of preparation was good. The colonoscope was slowly withdrawn with careful evaluation between folds. Retroflexion in the rectum was completed . Findings:   Rectum: normal  Sigmoid: 1 cm sessile polyp, removed by hot biopsies    Descending Colon: normal  Transverse Colon: normal  Ascending Colon: normal  Cecum: normal    Diffuse mild diverticulosis, seen throughout colon     Specimen Removed:   as above     Complications: None. EBL:  None.     Impression:     see findings     Recommendations:   High fiber diet    Recommendation for next colonscopy in 3 versus 5  years      Signed By: Tami Rausch MD     9/26/2023  8:18 AM

## 2023-09-26 NOTE — PROGRESS NOTES

## 2024-02-01 ENCOUNTER — HOSPITAL ENCOUNTER (OUTPATIENT)
Facility: HOSPITAL | Age: 74
Discharge: HOME | End: 2024-02-01
Payer: MEDICARE

## 2024-02-01 VITALS
RESPIRATION RATE: 18 BRPM | DIASTOLIC BLOOD PRESSURE: 81 MMHG | HEIGHT: 72 IN | BODY MASS INDEX: 30.96 KG/M2 | HEART RATE: 74 BPM | SYSTOLIC BLOOD PRESSURE: 128 MMHG | OXYGEN SATURATION: 96 % | TEMPERATURE: 98.8 F | WEIGHT: 228.6 LBS

## 2024-02-01 LAB
APPEARANCE UR: CLEAR
BACTERIA URNS QL MICRO: NEGATIVE /HPF
BILIRUB UR QL: NEGATIVE
COLOR UR: NORMAL
EPITH CASTS URNS QL MICRO: NORMAL /LPF
ERYTHROCYTE [DISTWIDTH] IN BLOOD BY AUTOMATED COUNT: 12.5 % (ref 11.5–14.5)
GLUCOSE UR STRIP.AUTO-MCNC: NEGATIVE MG/DL
HCT VFR BLD AUTO: 46.9 % (ref 36.6–50.3)
HGB BLD-MCNC: 16.6 G/DL (ref 12.1–17)
HGB UR QL STRIP: NEGATIVE
INR PPP: 1 (ref 0.9–1.1)
KETONES UR QL STRIP.AUTO: NEGATIVE MG/DL
LEUKOCYTE ESTERASE UR QL STRIP.AUTO: NEGATIVE
MCH RBC QN AUTO: 34.1 PG (ref 26–34)
MCHC RBC AUTO-ENTMCNC: 35.4 G/DL (ref 30–36.5)
MCV RBC AUTO: 96.3 FL (ref 80–99)
NITRITE UR QL STRIP.AUTO: NEGATIVE
NRBC # BLD: 0 K/UL (ref 0–0.01)
NRBC BLD-RTO: 0 PER 100 WBC
PH UR STRIP: 6 (ref 5–8)
PLATELET # BLD AUTO: 223 K/UL (ref 150–400)
PMV BLD AUTO: 10.1 FL (ref 8.9–12.9)
PROT UR STRIP-MCNC: NEGATIVE MG/DL
PROTHROMBIN TIME: 10.5 SEC (ref 9–11.1)
RBC # BLD AUTO: 4.87 M/UL (ref 4.1–5.7)
RBC #/AREA URNS HPF: NORMAL /HPF (ref 0–5)
SP GR UR REFRACTOMETRY: 1.02 (ref 1–1.03)
URINE CULTURE IF INDICATED: NORMAL
UROBILINOGEN UR QL STRIP.AUTO: 0.2 EU/DL (ref 0.2–1)
WBC # BLD AUTO: 7.8 K/UL (ref 4.1–11.1)
WBC URNS QL MICRO: NORMAL /HPF (ref 0–4)

## 2024-02-01 PROCEDURE — 85027 COMPLETE CBC AUTOMATED: CPT

## 2024-02-01 PROCEDURE — 36415 COLL VENOUS BLD VENIPUNCTURE: CPT

## 2024-02-01 PROCEDURE — 86901 BLOOD TYPING SEROLOGIC RH(D): CPT

## 2024-02-01 PROCEDURE — 86900 BLOOD TYPING SEROLOGIC ABO: CPT

## 2024-02-01 PROCEDURE — APPNB30 APP NON BILLABLE TIME 0-30 MINS: Performed by: NURSE PRACTITIONER

## 2024-02-01 PROCEDURE — 85610 PROTHROMBIN TIME: CPT

## 2024-02-01 PROCEDURE — 86850 RBC ANTIBODY SCREEN: CPT

## 2024-02-01 PROCEDURE — 81001 URINALYSIS AUTO W/SCOPE: CPT

## 2024-02-01 RX ORDER — IBUPROFEN 200 MG
400 TABLET ORAL PRN
COMMUNITY

## 2024-02-01 RX ORDER — VITAMIN B COMPLEX
1 CAPSULE ORAL EVERY MORNING
COMMUNITY

## 2024-02-01 RX ORDER — NAPROXEN 250 MG/1
500 TABLET ORAL EVERY EVENING
COMMUNITY

## 2024-02-01 ASSESSMENT — PROMIS GLOBAL HEALTH SCALE
HOW IS THE PROMIS V1.1 BEING ADMINISTERED?: 0
IN GENERAL, PLEASE RATE HOW WELL YOU CARRY OUT YOUR USUAL SOCIAL ACTIVITIES (INCLUDES ACTIVITIES AT HOME, AT WORK, AND IN YOUR COMMUNITY, AND RESPONSIBILITIES AS A PARENT, CHILD, SPOUSE, EMPLOYEE, FRIEND, ETC) [ON A SCALE OF 1 (POOR) TO 5 (EXCELLENT)]?: 3
TO WHAT EXTENT ARE YOU ABLE TO CARRY OUT YOUR EVERYDAY PHYSICAL ACTIVITIES SUCH AS WALKING, CLIMBING STAIRS, CARRYING GROCERIES, OR MOVING A CHAIR [ON A SCALE OF 1 (NOT AT ALL) TO 5 (COMPLETELY)]?: 1
WHO IS THE PERSON COMPLETING THE PROMIS V1.1 SURVEY?: 0
IN THE PAST 7 DAYS, HOW OFTEN HAVE YOU BEEN BOTHERED BY EMOTIONAL PROBLEMS, SUCH AS FEELING ANXIOUS, DEPRESSED, OR IRRITABLE [ON A SCALE FROM 1 (NEVER) TO 5 (ALWAYS)]?: 5
SUM OF RESPONSES TO QUESTIONS 2, 4, 5, & 10: 16
IN GENERAL, HOW WOULD YOU RATE YOUR SATISFACTION WITH YOUR SOCIAL ACTIVITIES AND RELATIONSHIPS [ON A SCALE OF 1 (POOR) TO 5 (EXCELLENT)]?: 3
IN THE PAST 7 DAYS, HOW WOULD YOU RATE YOUR PAIN ON AVERAGE [ON A SCALE FROM 0 (NO PAIN) TO 10 (WORST IMAGINABLE PAIN)]?: 5
IN GENERAL, HOW WOULD YOU RATE YOUR PHYSICAL HEALTH [ON A SCALE OF 1 (POOR) TO 5 (EXCELLENT)]?: 4
IN GENERAL, HOW WOULD YOU RATE YOUR MENTAL HEALTH, INCLUDING YOUR MOOD AND YOUR ABILITY TO THINK [ON A SCALE OF 1 (POOR) TO 5 (EXCELLENT)]?: 4
SUM OF RESPONSES TO QUESTIONS 3, 6, 7, & 8: 15
IN GENERAL, WOULD YOU SAY YOUR QUALITY OF LIFE IS...[ON A SCALE OF 1 (POOR) TO 5 (EXCELLENT)]: 4
IN GENERAL, WOULD YOU SAY YOUR HEALTH IS...[ON A SCALE OF 1 (POOR) TO 5 (EXCELLENT)]: 4
IN THE PAST 7 DAYS, HOW WOULD YOU RATE YOUR FATIGUE ON AVERAGE [ON A SCALE FROM 1 (NONE) TO 5 (VERY SEVERE)]?: 5

## 2024-02-01 ASSESSMENT — KOOS JR
HOW SEVERE IS YOUR KNEE STIFFNESS AFTER FIRST WAKING IN MORNING: 2
GOING UP OR DOWN STAIRS: 2
STANDING UPRIGHT: 2
BENDING TO THE FLOOR TO PICK UP OBJECT: 1
TWISING OR PIVOTING ON KNEE: 3
KOOS JR TOTAL INTERVAL SCORE: 54.84
RISING FROM SITTING: 1
STRAIGHTENING KNEE FULLY: 2

## 2024-02-01 ASSESSMENT — PAIN DESCRIPTION - LOCATION: LOCATION: KNEE

## 2024-02-01 ASSESSMENT — PAIN DESCRIPTION - PAIN TYPE: TYPE: ACUTE PAIN

## 2024-02-01 ASSESSMENT — PAIN DESCRIPTION - ORIENTATION: ORIENTATION: LEFT

## 2024-02-01 ASSESSMENT — PAIN SCALES - GENERAL: PAINLEVEL_OUTOF10: 5

## 2024-02-01 ASSESSMENT — PAIN DESCRIPTION - DESCRIPTORS: DESCRIPTORS: ACHING

## 2024-02-01 NOTE — PERIOP NOTE
[facility] PREOPERATIVE INSTRUCTIONS  ORTHOPAEDIC    Surgery Date:   02-    Your surgeon's office or [facility] staff will call you between 4 PM- 8 PM the day before surgery with your arrival time.  If your surgery is on a Monday, you will receive a call the preceding Friday. If your surgeon's office has given you, your arrival time then go by that time.    Please report to [facility] Patient Access/Admitting on the 1st floor.  Bring your insurance card, photo identification, and any copayment (if applicable).   If you are going home the same day of your surgery, you must have a responsible adult to drive you home. You need to have a responsible adult to stay with you the first 24 hours after surgery and you should not drive a car for 24 hours following your surgery.  If you are being admitted to the hospital,please leave personal belongings/luggage in your car until you have an assigned hospital room number.  Please wear comfortable clothes. Wear your glasses instead of contacts. We ask that all money, jewelry and valuables be left at home. Wear no make up, particularly mascara, the day of surgery.  Do NOT drink alcohol or smoke 24 hours before surgery. STOP smoking for 14 days prior as it helps with breathing and healing after surgery.   All body piercings, rings, and jewelry need to be removed and left at home. Do not wear any fingernail polish except for clear. Please wear your hair loose or down. Please no pony-tails, buns, or any metal hair accessories. You may wear deodorant. Do not shave any body area within 24 hours of your surgery.  Please follow all instructions to avoid any potential surgical cancellation.  Should your physical condition change, (i.e. fever, cold, flu, etc.) please notify your surgeon as soon as possible.  It is important to be on time. If a situation occurs where you may be delayed, please call:  [phone] / [phone] on the day of surgery.  The Preadmission

## 2024-02-01 NOTE — PERIOP NOTE
Ortho pre op and medication instructions printed and reviewed with patient. Two bottles of chg given.Surgical site infection sheet given. Opportunity for questions given and all questions were given.    Received EKG and hemoglobin A1C , Cmp results from pcp office and  placed on chart

## 2024-02-02 LAB
ABO + RH BLD: NORMAL
BACTERIA SPEC CULT: NORMAL
BACTERIA SPEC CULT: NORMAL
BLOOD GROUP ANTIBODIES SERPL: NORMAL
SERVICE CMNT-IMP: NORMAL
SPECIMEN EXP DATE BLD: NORMAL

## 2024-02-02 NOTE — PROGRESS NOTES
PAT Nurse Practitioner   Pre-Operative Chart Review/Assessment:-ORTHOPEDIC                Patient Name:  Chuy rKause                                                           Age:   73 y.o.    :  1950     Today's Date:  2024     Date of PAT:   24      Date of Surgery:    24      Procedure(s):  Left Total Knee Arthroplasty     Surgeon:   Dr. Hu                       PLAN:      1)  PCP:  Isaac Frederick MD      2)  Cardiac Clearance:  EKG and METs reviewed. No further cardiac evaluation requested. PCP EKG showed normal sinus rhythm.         3)  Diabetic Treatment Consult:  Not indicated. A1c-5.4      4)  Sleep Apnea evaluation:   Not indicated. VALERIA Score 2.       5) Treatment for MRSA/Staph Aureus:  Neg      6) Additional Concerns:  None      Additional Orders for Day of Surgery:  none                Vital Signs:        Vitals:    24 1000   BP: 128/81   Pulse: 74   Resp: 18   Temp: 98.8 °F (37.1 °C)   SpO2: 96%             Body mass index is 31.44 kg/m².       KNEE DISABILITY OSTEOARTHRITIS AND OUTCOME SCORE    Stiffness - The following question concerns the amount of joing stiffness you have experienced during the last week in your knee. Stiffness is a sensation of restriction or slowness in the ease with which you move your knee joint.  How severe is your knee stiffness after first wakening in the morning?: Moderate    Pain - What amount of knee pain have you experienced the last week during the following activities?  Twisting/pivoting on your knee: Severe  Straightening knee fully: Moderate  Going up or down stairs: Moderate  Standing upright: Moderate    Function - Please indicate the degree of difficulty you have experienced in the last week due to your knee.  Rising from sitting: Mild  Bending to floor/ an object: Mild    Raw Score  Jr ABRAHAM. Knee Survey Score: 13  KOOS JR Total Interval Score (0-100 Scale): 54.84    ____________________________________________  PAST MEDICAL  [employer]

## 2024-02-05 PROBLEM — Z01.818 ENCOUNTER FOR PREADMISSION TESTING: Status: ACTIVE | Noted: 2024-02-05

## 2024-02-08 ENCOUNTER — HOSPITAL ENCOUNTER (INPATIENT)
Facility: HOSPITAL | Age: 74
LOS: 3 days | Discharge: NURSING, REHAB OR LONG-TERM CARE | DRG: 470 | End: 2024-02-13
Attending: ORTHOPAEDIC SURGERY | Admitting: ORTHOPAEDIC SURGERY
Payer: MEDICARE

## 2024-02-08 ENCOUNTER — ANESTHESIA (OUTPATIENT)
Facility: HOSPITAL | Age: 74
End: 2024-02-08
Payer: MEDICARE

## 2024-02-08 ENCOUNTER — ANESTHESIA EVENT (OUTPATIENT)
Facility: HOSPITAL | Age: 74
End: 2024-02-08
Payer: MEDICARE

## 2024-02-08 DIAGNOSIS — M17.12 OSTEOARTHRITIS OF LEFT KNEE, UNSPECIFIED OSTEOARTHRITIS TYPE: Primary | ICD-10-CM

## 2024-02-08 LAB
GLUCOSE BLD STRIP.AUTO-MCNC: 69 MG/DL (ref 65–117)
SERVICE CMNT-IMP: NORMAL

## 2024-02-08 PROCEDURE — 82962 GLUCOSE BLOOD TEST: CPT

## 2024-02-08 PROCEDURE — 2709999900 HC NON-CHARGEABLE SUPPLY: Performed by: ORTHOPAEDIC SURGERY

## 2024-02-08 PROCEDURE — G0378 HOSPITAL OBSERVATION PER HR: HCPCS

## 2024-02-08 PROCEDURE — 3700000001 HC ADD 15 MINUTES (ANESTHESIA): Performed by: ORTHOPAEDIC SURGERY

## 2024-02-08 PROCEDURE — 2500000003 HC RX 250 WO HCPCS: Performed by: ORTHOPAEDIC SURGERY

## 2024-02-08 PROCEDURE — 6360000002 HC RX W HCPCS: Performed by: ANESTHESIOLOGY

## 2024-02-08 PROCEDURE — 3600000015 HC SURGERY LEVEL 5 ADDTL 15MIN: Performed by: ORTHOPAEDIC SURGERY

## 2024-02-08 PROCEDURE — 6360000002 HC RX W HCPCS: Performed by: ORTHOPAEDIC SURGERY

## 2024-02-08 PROCEDURE — 97530 THERAPEUTIC ACTIVITIES: CPT

## 2024-02-08 PROCEDURE — 51798 US URINE CAPACITY MEASURE: CPT

## 2024-02-08 PROCEDURE — 2580000003 HC RX 258: Performed by: ORTHOPAEDIC SURGERY

## 2024-02-08 PROCEDURE — 3600000005 HC SURGERY LEVEL 5 BASE: Performed by: ORTHOPAEDIC SURGERY

## 2024-02-08 PROCEDURE — 97116 GAIT TRAINING THERAPY: CPT

## 2024-02-08 PROCEDURE — 6370000000 HC RX 637 (ALT 250 FOR IP): Performed by: ORTHOPAEDIC SURGERY

## 2024-02-08 PROCEDURE — 64447 NJX AA&/STRD FEMORAL NRV IMG: CPT | Performed by: ANESTHESIOLOGY

## 2024-02-08 PROCEDURE — 6360000002 HC RX W HCPCS

## 2024-02-08 PROCEDURE — 3700000000 HC ANESTHESIA ATTENDED CARE: Performed by: ORTHOPAEDIC SURGERY

## 2024-02-08 PROCEDURE — 97161 PT EVAL LOW COMPLEX 20 MIN: CPT

## 2024-02-08 PROCEDURE — 2580000003 HC RX 258: Performed by: ANESTHESIOLOGY

## 2024-02-08 PROCEDURE — 2500000003 HC RX 250 WO HCPCS

## 2024-02-08 PROCEDURE — 2580000003 HC RX 258

## 2024-02-08 PROCEDURE — C1776 JOINT DEVICE (IMPLANTABLE): HCPCS | Performed by: ORTHOPAEDIC SURGERY

## 2024-02-08 PROCEDURE — C9290 INJ, BUPIVACAINE LIPOSOME: HCPCS | Performed by: ORTHOPAEDIC SURGERY

## 2024-02-08 PROCEDURE — C1713 ANCHOR/SCREW BN/BN,TIS/BN: HCPCS | Performed by: ORTHOPAEDIC SURGERY

## 2024-02-08 PROCEDURE — 7100000001 HC PACU RECOVERY - ADDTL 15 MIN: Performed by: ORTHOPAEDIC SURGERY

## 2024-02-08 PROCEDURE — 0SRD0J9 REPLACEMENT OF LEFT KNEE JOINT WITH SYNTHETIC SUBSTITUTE, CEMENTED, OPEN APPROACH: ICD-10-PCS | Performed by: ORTHOPAEDIC SURGERY

## 2024-02-08 PROCEDURE — APPNB30 APP NON BILLABLE TIME 0-30 MINS: Performed by: NURSE PRACTITIONER

## 2024-02-08 PROCEDURE — 7100000000 HC PACU RECOVERY - FIRST 15 MIN: Performed by: ORTHOPAEDIC SURGERY

## 2024-02-08 DEVICE — CEMENT BNE MED VISC 80 GM GENTAMICIN PALACOS MV+G PRO: Type: IMPLANTABLE DEVICE | Site: KNEE | Status: FUNCTIONAL

## 2024-02-08 DEVICE — COMPONENT TOT KNEE CAPPED K1 STD HEMI CEM: Type: IMPLANTABLE DEVICE | Status: FUNCTIONAL

## 2024-02-08 DEVICE — IMPLANTABLE DEVICE
Type: IMPLANTABLE DEVICE | Site: KNEE | Status: FUNCTIONAL
Brand: TRULIANT

## 2024-02-08 DEVICE — IMPLANTABLE DEVICE
Type: IMPLANTABLE DEVICE | Site: KNEE | Status: FUNCTIONAL
Brand: OPTETRAK

## 2024-02-08 RX ORDER — HYDROMORPHONE HYDROCHLORIDE 1 MG/ML
0.5 INJECTION, SOLUTION INTRAMUSCULAR; INTRAVENOUS; SUBCUTANEOUS EVERY 5 MIN PRN
Status: DISCONTINUED | OUTPATIENT
Start: 2024-02-08 | End: 2024-02-08 | Stop reason: HOSPADM

## 2024-02-08 RX ORDER — SODIUM CHLORIDE 0.9 % (FLUSH) 0.9 %
5-40 SYRINGE (ML) INJECTION PRN
Status: DISCONTINUED | OUTPATIENT
Start: 2024-02-08 | End: 2024-02-08 | Stop reason: HOSPADM

## 2024-02-08 RX ORDER — EPHEDRINE SULFATE 50 MG/ML
INJECTION INTRAVENOUS PRN
Status: DISCONTINUED | OUTPATIENT
Start: 2024-02-08 | End: 2024-02-08 | Stop reason: SDUPTHER

## 2024-02-08 RX ORDER — OXYCODONE HYDROCHLORIDE 5 MG/1
5-10 TABLET ORAL EVERY 4 HOURS PRN
Qty: 40 TABLET | Refills: 0 | Status: SHIPPED | OUTPATIENT
Start: 2024-02-08 | End: 2024-02-13 | Stop reason: HOSPADM

## 2024-02-08 RX ORDER — FAMOTIDINE 20 MG/1
20 TABLET, FILM COATED ORAL 2 TIMES DAILY
Status: DISCONTINUED | OUTPATIENT
Start: 2024-02-08 | End: 2024-02-13 | Stop reason: HOSPADM

## 2024-02-08 RX ORDER — ROPIVACAINE HYDROCHLORIDE 5 MG/ML
INJECTION, SOLUTION EPIDURAL; INFILTRATION; PERINEURAL
Status: COMPLETED | OUTPATIENT
Start: 2024-02-08 | End: 2024-02-08

## 2024-02-08 RX ORDER — SODIUM CHLORIDE 9 MG/ML
INJECTION, SOLUTION INTRAVENOUS PRN
Status: DISCONTINUED | OUTPATIENT
Start: 2024-02-08 | End: 2024-02-08 | Stop reason: HOSPADM

## 2024-02-08 RX ORDER — PHENYLEPHRINE HCL IN 0.9% NACL 0.4MG/10ML
SYRINGE (ML) INTRAVENOUS PRN
Status: DISCONTINUED | OUTPATIENT
Start: 2024-02-08 | End: 2024-02-08 | Stop reason: SDUPTHER

## 2024-02-08 RX ORDER — ONDANSETRON 4 MG/1
4 TABLET, ORALLY DISINTEGRATING ORAL EVERY 8 HOURS PRN
Status: DISCONTINUED | OUTPATIENT
Start: 2024-02-08 | End: 2024-02-13 | Stop reason: HOSPADM

## 2024-02-08 RX ORDER — HYDROXYZINE HYDROCHLORIDE 10 MG/1
10 TABLET, FILM COATED ORAL EVERY 8 HOURS PRN
Status: DISCONTINUED | OUTPATIENT
Start: 2024-02-08 | End: 2024-02-13 | Stop reason: HOSPADM

## 2024-02-08 RX ORDER — ONDANSETRON 2 MG/ML
4 INJECTION INTRAMUSCULAR; INTRAVENOUS EVERY 6 HOURS PRN
Status: DISCONTINUED | OUTPATIENT
Start: 2024-02-08 | End: 2024-02-13 | Stop reason: HOSPADM

## 2024-02-08 RX ORDER — OXYCODONE HYDROCHLORIDE 5 MG/1
5 TABLET ORAL
Status: DISCONTINUED | OUTPATIENT
Start: 2024-02-08 | End: 2024-02-08 | Stop reason: HOSPADM

## 2024-02-08 RX ORDER — SODIUM CHLORIDE 9 MG/ML
INJECTION, SOLUTION INTRAVENOUS PRN
Status: DISCONTINUED | OUTPATIENT
Start: 2024-02-08 | End: 2024-02-13 | Stop reason: HOSPADM

## 2024-02-08 RX ORDER — SENNOSIDES A AND B 8.6 MG/1
1 TABLET, FILM COATED ORAL DAILY PRN
Status: DISCONTINUED | OUTPATIENT
Start: 2024-02-08 | End: 2024-02-13 | Stop reason: HOSPADM

## 2024-02-08 RX ORDER — SODIUM CHLORIDE 0.9 % (FLUSH) 0.9 %
5-40 SYRINGE (ML) INJECTION PRN
Status: DISCONTINUED | OUTPATIENT
Start: 2024-02-08 | End: 2024-02-13 | Stop reason: HOSPADM

## 2024-02-08 RX ORDER — ACETAMINOPHEN 500 MG
1000 TABLET ORAL ONCE
Status: DISCONTINUED | OUTPATIENT
Start: 2024-02-08 | End: 2024-02-08 | Stop reason: HOSPADM

## 2024-02-08 RX ORDER — MIDAZOLAM HYDROCHLORIDE 2 MG/2ML
2 INJECTION, SOLUTION INTRAMUSCULAR; INTRAVENOUS
Status: COMPLETED | OUTPATIENT
Start: 2024-02-08 | End: 2024-02-08

## 2024-02-08 RX ORDER — ACETAMINOPHEN 500 MG
1000 TABLET ORAL ONCE
Status: DISCONTINUED | OUTPATIENT
Start: 2024-02-08 | End: 2024-02-08 | Stop reason: SDUPTHER

## 2024-02-08 RX ORDER — LIDOCAINE HYDROCHLORIDE 20 MG/ML
INJECTION, SOLUTION EPIDURAL; INFILTRATION; INTRACAUDAL; PERINEURAL PRN
Status: DISCONTINUED | OUTPATIENT
Start: 2024-02-08 | End: 2024-02-08 | Stop reason: SDUPTHER

## 2024-02-08 RX ORDER — BUPIVACAINE HYDROCHLORIDE 5 MG/ML
INJECTION, SOLUTION EPIDURAL; INTRACAUDAL
Status: COMPLETED | OUTPATIENT
Start: 2024-02-08 | End: 2024-02-08

## 2024-02-08 RX ORDER — DEXAMETHASONE SODIUM PHOSPHATE 10 MG/ML
8 INJECTION, SOLUTION INTRAMUSCULAR; INTRAVENOUS ONCE
Status: DISCONTINUED | OUTPATIENT
Start: 2024-02-08 | End: 2024-02-08 | Stop reason: HOSPADM

## 2024-02-08 RX ORDER — BISACODYL 5 MG/1
5 TABLET, DELAYED RELEASE ORAL DAILY PRN
Status: DISCONTINUED | OUTPATIENT
Start: 2024-02-08 | End: 2024-02-13 | Stop reason: HOSPADM

## 2024-02-08 RX ORDER — LIDOCAINE HYDROCHLORIDE 10 MG/ML
1 INJECTION, SOLUTION EPIDURAL; INFILTRATION; INTRACAUDAL; PERINEURAL
Status: DISCONTINUED | OUTPATIENT
Start: 2024-02-08 | End: 2024-02-08 | Stop reason: HOSPADM

## 2024-02-08 RX ORDER — OXYCODONE HYDROCHLORIDE 5 MG/1
10 TABLET ORAL EVERY 4 HOURS PRN
Status: DISCONTINUED | OUTPATIENT
Start: 2024-02-08 | End: 2024-02-09

## 2024-02-08 RX ORDER — SODIUM CHLORIDE 0.9 % (FLUSH) 0.9 %
5-40 SYRINGE (ML) INJECTION EVERY 12 HOURS SCHEDULED
Status: DISCONTINUED | OUTPATIENT
Start: 2024-02-08 | End: 2024-02-13 | Stop reason: HOSPADM

## 2024-02-08 RX ORDER — TRANEXAMIC ACID 100 MG/ML
INJECTION, SOLUTION INTRAVENOUS PRN
Status: DISCONTINUED | OUTPATIENT
Start: 2024-02-08 | End: 2024-02-08 | Stop reason: HOSPADM

## 2024-02-08 RX ORDER — ONDANSETRON 2 MG/ML
4 INJECTION INTRAMUSCULAR; INTRAVENOUS
Status: DISCONTINUED | OUTPATIENT
Start: 2024-02-08 | End: 2024-02-08 | Stop reason: HOSPADM

## 2024-02-08 RX ORDER — SODIUM CHLORIDE, SODIUM LACTATE, POTASSIUM CHLORIDE, CALCIUM CHLORIDE 600; 310; 30; 20 MG/100ML; MG/100ML; MG/100ML; MG/100ML
INJECTION, SOLUTION INTRAVENOUS CONTINUOUS
Status: DISCONTINUED | OUTPATIENT
Start: 2024-02-08 | End: 2024-02-08 | Stop reason: HOSPADM

## 2024-02-08 RX ORDER — SODIUM CHLORIDE 0.9 % (FLUSH) 0.9 %
5-40 SYRINGE (ML) INJECTION EVERY 12 HOURS SCHEDULED
Status: DISCONTINUED | OUTPATIENT
Start: 2024-02-08 | End: 2024-02-08 | Stop reason: HOSPADM

## 2024-02-08 RX ORDER — GINSENG 100 MG
CAPSULE ORAL PRN
Status: DISCONTINUED | OUTPATIENT
Start: 2024-02-08 | End: 2024-02-08 | Stop reason: HOSPADM

## 2024-02-08 RX ORDER — PROPOFOL 10 MG/ML
INJECTION, EMULSION INTRAVENOUS PRN
Status: DISCONTINUED | OUTPATIENT
Start: 2024-02-08 | End: 2024-02-08 | Stop reason: SDUPTHER

## 2024-02-08 RX ORDER — PROCHLORPERAZINE EDISYLATE 5 MG/ML
5 INJECTION INTRAMUSCULAR; INTRAVENOUS
Status: DISCONTINUED | OUTPATIENT
Start: 2024-02-08 | End: 2024-02-08 | Stop reason: HOSPADM

## 2024-02-08 RX ORDER — SODIUM CHLORIDE 9 MG/ML
INJECTION, SOLUTION INTRAVENOUS CONTINUOUS
Status: DISCONTINUED | OUTPATIENT
Start: 2024-02-08 | End: 2024-02-13 | Stop reason: HOSPADM

## 2024-02-08 RX ORDER — HYDROMORPHONE HYDROCHLORIDE 1 MG/ML
0.5 INJECTION, SOLUTION INTRAMUSCULAR; INTRAVENOUS; SUBCUTANEOUS
Status: DISCONTINUED | OUTPATIENT
Start: 2024-02-08 | End: 2024-02-09

## 2024-02-08 RX ORDER — ACETAMINOPHEN 325 MG/1
650 TABLET ORAL EVERY 6 HOURS
Status: DISCONTINUED | OUTPATIENT
Start: 2024-02-08 | End: 2024-02-13 | Stop reason: HOSPADM

## 2024-02-08 RX ORDER — FENTANYL CITRATE 50 UG/ML
25 INJECTION, SOLUTION INTRAMUSCULAR; INTRAVENOUS EVERY 5 MIN PRN
Status: DISCONTINUED | OUTPATIENT
Start: 2024-02-08 | End: 2024-02-08 | Stop reason: HOSPADM

## 2024-02-08 RX ORDER — FENTANYL CITRATE 50 UG/ML
100 INJECTION, SOLUTION INTRAMUSCULAR; INTRAVENOUS
Status: COMPLETED | OUTPATIENT
Start: 2024-02-08 | End: 2024-02-08

## 2024-02-08 RX ORDER — HYDRALAZINE HYDROCHLORIDE 20 MG/ML
10 INJECTION INTRAMUSCULAR; INTRAVENOUS
Status: DISCONTINUED | OUTPATIENT
Start: 2024-02-08 | End: 2024-02-08 | Stop reason: HOSPADM

## 2024-02-08 RX ORDER — ONDANSETRON 2 MG/ML
INJECTION INTRAMUSCULAR; INTRAVENOUS PRN
Status: DISCONTINUED | OUTPATIENT
Start: 2024-02-08 | End: 2024-02-08 | Stop reason: SDUPTHER

## 2024-02-08 RX ORDER — OXYCODONE HYDROCHLORIDE 5 MG/1
5 TABLET ORAL EVERY 4 HOURS PRN
Status: DISCONTINUED | OUTPATIENT
Start: 2024-02-08 | End: 2024-02-13 | Stop reason: HOSPADM

## 2024-02-08 RX ADMIN — SODIUM CHLORIDE, POTASSIUM CHLORIDE, SODIUM LACTATE AND CALCIUM CHLORIDE: 600; 310; 30; 20 INJECTION, SOLUTION INTRAVENOUS at 10:58

## 2024-02-08 RX ADMIN — EPHEDRINE SULFATE 10 MG: 50 INJECTION INTRAVENOUS at 13:16

## 2024-02-08 RX ADMIN — WARFARIN SODIUM 7.5 MG: 2.5 TABLET ORAL at 16:13

## 2024-02-08 RX ADMIN — MIDAZOLAM 2 MG: 1 INJECTION INTRAMUSCULAR; INTRAVENOUS at 10:52

## 2024-02-08 RX ADMIN — SODIUM CHLORIDE: 9 INJECTION, SOLUTION INTRAVENOUS at 12:57

## 2024-02-08 RX ADMIN — FENTANYL CITRATE 50 MCG: 50 INJECTION INTRAMUSCULAR; INTRAVENOUS at 10:52

## 2024-02-08 RX ADMIN — BUPIVACAINE HYDROCHLORIDE 10 MG: 5 INJECTION, SOLUTION EPIDURAL; INTRACAUDAL; PERINEURAL at 11:22

## 2024-02-08 RX ADMIN — ACETAMINOPHEN 650 MG: 325 TABLET ORAL at 21:34

## 2024-02-08 RX ADMIN — ACETAMINOPHEN 650 MG: 325 TABLET ORAL at 16:13

## 2024-02-08 RX ADMIN — PHENYLEPHRINE HYDROCHLORIDE 40 MCG/MIN: 10 INJECTION INTRAVENOUS at 11:26

## 2024-02-08 RX ADMIN — Medication 80 MCG: at 12:51

## 2024-02-08 RX ADMIN — ROPIVACAINE HYDROCHLORIDE 20 ML: 5 INJECTION, SOLUTION EPIDURAL; INFILTRATION; PERINEURAL at 10:51

## 2024-02-08 RX ADMIN — ONDANSETRON 4 MG: 2 INJECTION INTRAMUSCULAR; INTRAVENOUS at 13:16

## 2024-02-08 RX ADMIN — FAMOTIDINE 20 MG: 20 TABLET ORAL at 21:34

## 2024-02-08 RX ADMIN — WATER 2000 MG: 1 INJECTION INTRAMUSCULAR; INTRAVENOUS; SUBCUTANEOUS at 18:39

## 2024-02-08 RX ADMIN — EPHEDRINE SULFATE 5 MG: 50 INJECTION INTRAVENOUS at 12:56

## 2024-02-08 RX ADMIN — EPHEDRINE SULFATE 5 MG: 50 INJECTION INTRAVENOUS at 11:34

## 2024-02-08 RX ADMIN — OXYCODONE 10 MG: 5 TABLET ORAL at 16:51

## 2024-02-08 RX ADMIN — SODIUM CHLORIDE, PRESERVATIVE FREE 10 ML: 5 INJECTION INTRAVENOUS at 21:35

## 2024-02-08 RX ADMIN — LIDOCAINE HYDROCHLORIDE 20 MG: 20 INJECTION, SOLUTION EPIDURAL; INFILTRATION; INTRACAUDAL; PERINEURAL at 11:03

## 2024-02-08 RX ADMIN — WATER 2000 MG: 1 INJECTION INTRAMUSCULAR; INTRAVENOUS; SUBCUTANEOUS at 11:25

## 2024-02-08 RX ADMIN — PROPOFOL 40 MCG/KG/MIN: 10 INJECTION, EMULSION INTRAVENOUS at 11:04

## 2024-02-08 RX ADMIN — OXYCODONE 10 MG: 5 TABLET ORAL at 21:34

## 2024-02-08 RX ADMIN — EPHEDRINE SULFATE 5 MG: 50 INJECTION INTRAVENOUS at 12:08

## 2024-02-08 RX ADMIN — SODIUM CHLORIDE: 9 INJECTION, SOLUTION INTRAVENOUS at 16:12

## 2024-02-08 RX ADMIN — PROPOFOL 40 MG: 10 INJECTION, EMULSION INTRAVENOUS at 11:03

## 2024-02-08 ASSESSMENT — PAIN SCALES - GENERAL
PAINLEVEL_OUTOF10: 4
PAINLEVEL_OUTOF10: 6
PAINLEVEL_OUTOF10: 3
PAINLEVEL_OUTOF10: 8
PAINLEVEL_OUTOF10: 8
PAINLEVEL_OUTOF10: 7

## 2024-02-08 ASSESSMENT — PAIN - FUNCTIONAL ASSESSMENT
PAIN_FUNCTIONAL_ASSESSMENT: NONE - DENIES PAIN
PAIN_FUNCTIONAL_ASSESSMENT: ACTIVITIES ARE NOT PREVENTED
PAIN_FUNCTIONAL_ASSESSMENT: 0-10
PAIN_FUNCTIONAL_ASSESSMENT: ACTIVITIES ARE NOT PREVENTED
PAIN_FUNCTIONAL_ASSESSMENT: PREVENTS OR INTERFERES WITH ALL ACTIVE AND SOME PASSIVE ACTIVITIES
PAIN_FUNCTIONAL_ASSESSMENT: PREVENTS OR INTERFERES SOME ACTIVE ACTIVITIES AND ADLS
PAIN_FUNCTIONAL_ASSESSMENT: NONE - DENIES PAIN

## 2024-02-08 ASSESSMENT — PAIN DESCRIPTION - ORIENTATION
ORIENTATION: LEFT

## 2024-02-08 ASSESSMENT — PAIN DESCRIPTION - LOCATION
LOCATION: KNEE

## 2024-02-08 ASSESSMENT — PAIN DESCRIPTION - PAIN TYPE
TYPE: ACUTE PAIN;SURGICAL PAIN
TYPE: ACUTE PAIN;SURGICAL PAIN
TYPE: SURGICAL PAIN
TYPE: ACUTE PAIN;SURGICAL PAIN
TYPE: ACUTE PAIN;SURGICAL PAIN

## 2024-02-08 ASSESSMENT — PAIN DESCRIPTION - DESCRIPTORS
DESCRIPTORS: ACHING;DISCOMFORT
DESCRIPTORS: ACHING
DESCRIPTORS: ACHING;DISCOMFORT
DESCRIPTORS: ACHING;DISCOMFORT
DESCRIPTORS: ACHING;SORE
DESCRIPTORS: ACHING;DISCOMFORT

## 2024-02-08 NOTE — ANESTHESIA PRE PROCEDURE
Department of Anesthesiology  Preprocedure Note       Name:  Chuy Krause   Age:  73 y.o.  :  1950                                          MRN:  [id number]         Date:  2024      Surgeon: Surgeon(s):  Ashley Hu MD    Procedure: Procedure(s):  LEFT TOTAL KNEE ARTHROPLASTY (GEN/BLOCK)    Medications prior to admission:   Prior to Admission medications    Medication Sig Start Date End Date Taking? Authorizing Provider   b complex vitamins capsule Take 1 capsule by mouth every morning    Michael Levine MD   GARLIC PO Take 1 tablet by mouth every morning    Michael Levine MD   naproxen (NAPROSYN) 250 MG tablet Take 2 tablets by mouth every evening    Michael Levine MD   ibuprofen (ADVIL;MOTRIN) 200 MG tablet Take 2 tablets by mouth as needed for Pain    Michael Levine MD   diclofenac sodium (VOLTAREN) 1 % GEL Apply 2 g topically every morning    Michael Levine MD   turmeric 500 MG CAPS Take 2 capsules by mouth Daily with supper    Michael Levine MD   Omega-3 Fatty Acids (FISH OIL) 1000 MG capsule Take by mouth 2 times daily    Michael Levine MD   Red Yeast Rice Extract (RED YEAST RICE PO) Take 1,000 mg by mouth daily    Automatic Reconciliation, Ar   vitamin D (CHOLECALCIFEROL) 125 MCG (5000 UT) CAPS capsule Take 1 capsule by mouth every morning    Automatic Reconciliation, Ar   metFORMIN (GLUCOPHAGE) 500 MG tablet Take 2 tablets by mouth daily (with breakfast)    Automatic Reconciliation, Ar       Current medications:    Current Facility-Administered Medications   Medication Dose Route Frequency Provider Last Rate Last Admin    lidocaine PF 1 % injection 1 mL  1 mL IntraDERmal Once PRN Niya Crespo I, DO        acetaminophen (TYLENOL) tablet 1,000 mg  1,000 mg Oral Once Niya Crespo I, DO        fentaNYL (SUBLIMAZE) injection 100 mcg  100 mcg IntraVENous Once PRN Niya Crespo I, DO        lactated ringers IV soln infusion

## 2024-02-08 NOTE — H&P
[facility] PRE-OP HISTORY AND PHYSICAL    Subjective:     Patient is a 73 y.o. male presented with a history of left knee.  Onset of symptoms was gradual with gradually worsening course since that time. He is being admitted for surgical management of this condition.         Patient Active Problem List    Diagnosis Date Noted    Encounter for preadmission testing 02/05/2024     Past Medical History:   Diagnosis Date    Arthritis of low back     Basal cell carcinoma (BCC)     Osteoarthritis of both knees     Spinal stenosis of lumbar region       Past Surgical History:   Procedure Laterality Date    APPENDECTOMY      COLONOSCOPY N/A 2/19/2018    COLONOSCOPY performed by Miguelito Lunsford MD at [facility] ENDOSCOPY    COLONOSCOPY N/A 09/26/2023    COLONOSCOPY performed by Miguelito Lunsford MD at [facility] ENDOSCOPY    KYPHOSIS SURGERY      SKIN BIOPSY      TONSILLECTOMY        Prior to Admission medications    Medication Sig Start Date End Date Taking? Authorizing Provider   b complex vitamins capsule Take 1 capsule by mouth every morning    Michael Levine MD   GARLIC PO Take 1 tablet by mouth every morning    Michael Levine MD   naproxen (NAPROSYN) 250 MG tablet Take 2 tablets by mouth every evening    Michael Levine MD   ibuprofen (ADVIL;MOTRIN) 200 MG tablet Take 2 tablets by mouth as needed for Pain    ProviderMichael MD   diclofenac sodium (VOLTAREN) 1 % GEL Apply 2 g topically every morning    Michael Levine MD   turmeric 500 MG CAPS Take 2 capsules by mouth Daily with supper    ProviderMichael MD   Omega-3 Fatty Acids (FISH OIL) 1000 MG capsule Take by mouth 2 times daily    ProviderMichael MD   Red Yeast Rice Extract (RED YEAST RICE PO) Take 1,000 mg by mouth daily    Automatic Reconciliation, Ar   vitamin D (CHOLECALCIFEROL) 125 MCG (5000 UT) CAPS capsule Take 1 capsule by mouth every morning    Automatic Reconciliation, Ar   metFORMIN (GLUCOPHAGE) 500 MG tablet Take 2 tablets

## 2024-02-08 NOTE — FLOWSHEET NOTE
02/08/24 1145   Family Communication    Relationship to Patient Spouse  (DUSTIN AVILA)    Phone Number [phone]   Family/Significant Other Update Called   Delivery Origin Nurse   Message Disposition Family present - message delivered   Update Given Yes   Family Communication   Family Update Message Procedure started

## 2024-02-08 NOTE — PLAN OF CARE
Problem: Physical Therapy - Adult  Goal: By Discharge: Performs mobility at highest level of function for planned discharge setting.  See evaluation for individualized goals.  Description: FUNCTIONAL STATUS PRIOR TO ADMISSION: Patient was modified independent using a single point cane for functional mobility.    HOME SUPPORT PRIOR TO ADMISSION: The patient lived with wife but did not require assistance.    Physical Therapy Goals  Initiated 2/8/2024  1.  Patient will move from supine to sit and sit to supine and scoot up and down in bed with modified independence within 4 day(s).    2.  Patient will perform sit to stand with modified independence within 4 day(s).  3.  Patient will transfer from bed to chair and chair to bed with modified independence using the least restrictive device within 4 day(s).  4.  Patient will ambulate with modified independence for > 150 feet with the least restrictive device within 4 day(s).   5.  Patient will ascend/descend 4 stairs with one handrail(s) with supervision within 4 day(s).  6. Patient will perform TKR home exercise program per protocol with supervision/set-up within 4 days.  7. Patient will demonstrate AROM 0-90 degrees in operative joint within 4 days.     PHYSICAL THERAPY EVALUATION    Patient: Chuy Krause (73 y.o. male)  Date: 2/8/2024  Primary Diagnosis: Primary osteoarthritis of left knee [M17.12]  Osteoarthritis of left knee, unspecified osteoarthritis type [M17.12]  Procedure(s) (LRB):  LEFT TOTAL KNEE ARTHROPLASTY (GEN/BLOCK) (Left) Day of Surgery   Precautions: Restrictions/Precautions: Weight Bearing, Fall Risk   Lower Extremity Weight Bearing Restrictions  Left Lower Extremity Weight Bearing: Weight Bearing As Tolerated                  ASSESSMENT :   DEFICITS/IMPAIRMENTS:   The patient presents POD # 0 left TKR with pain left knee, decreased AROM/strength and function left leg, decreased activity tolerance, decline in functional mobility and impaired  to Supine: Stand-by assistance  Scooting: Stand-by assistance  Transfers:     Transfer Training  Transfer Training: Yes  Overall Level of Assistance: Contact-guard assistance;Additional time;Adaptive equipment  Interventions: Verbal cues;Tactile cues;Safety awareness training  Sit to Stand: Contact-guard assistance;Additional time  Stand to Sit: Contact-guard assistance;Additional time  Balance:               Balance  Sitting: Intact  Standing: Impaired  Standing - Static: Good;Constant support  Standing - Dynamic: Constant support;Fair (requires bilateral support of RW for amb)  Ambulation/Gait Training:                       Gait  Gait Training: Yes  Overall Level of Assistance: Contact-guard assistance;Additional time;Adaptive equipment  Distance (ft): 30 Feet  Assistive Device: Gait belt;Walker, rolling  Interventions: Verbal cues;Tactile cues;Safety awareness training  Base of Support: Center of gravity altered;Shift to right  Speed/Vianca: Slow  Step Length: Right shortened;Left shortened  Swing Pattern: Left asymmetrical  Stance: Left decreased  Gait Abnormalities: Antalgic;Decreased step clearance                                                                                                                                                                                                                                                 Intervention/Education specific to: \"knee replacement\"    Education provided to avoid resting in external rotation or knee flexion while in bed. Discussed avoidance of resting with a pillow under the knee but that a pillow from calf to heel is acceptable for short periods if it supports knee extension. Encouraged use of cryo therapy to aide in pain and edema control.              Therapeutic Exercises:   Pt instructed and performed ankle pumps and demonstrated proper use of incentive spirometer - to be performed x 10 reps once hr when awake.   Written instructions provided on

## 2024-02-08 NOTE — OP NOTE
[facility]  OPERATIVE REPORT    Name:  CAMERON AVILA  MR#:  [id number]  :  1950  ACCOUNT #:  [id number]  DATE OF SERVICE:  2024    PREOPERATIVE DIAGNOSIS:  Varus deformity and end-stage osteoarthritis of left knee.    POSTOPERATIVE DIAGNOSIS:  Varus deformity and end-stage osteoarthritis of left knee.    PROCEDURE PERFORMED:  Left total knee arthroplasty.    SURGEON:  Ashley Hu MD    SECOND SURGEON:  Jim Martinez MD    ASSISTANT:  DOMITILA Mckinley    ANESTHESIA:  Spinal with a MAC and 266 mg Exparel.    COMPLICATIONS:  Negative.    SPECIMENS REMOVED:  Bone, discarded.    IMPLANTS:  Exactech Truliant knee, size 4.5 femur, 4.5 tibia with a 15 CRC poly and a 38 patella.    ESTIMATED BLOOD LOSS:  Approximately 200 mL crystalloids given.    PERIOPERATIVE MEDICINE:  Ancef 2 g.    INDICATIONS/HISTORY:  The patient is a 73-year gentleman who presented to my office with bilateral knee pain.  He had varus deformity and bone-on-bone arthritis noted on x-ray.  We initiated conservative treatment, but unfortunately, his pain persisted.  At that time, he presented to the office and wished to have a conversation about knee replacement.  Based on his pain and his x-ray, he certainly had every indication to consider this.  His wife was available during the conversation.  We had a long conversation about this.  I went over the surgical technique with the patient.  We talked about the expectations and the expected outcome.  We also talked about the risks such as but not limited to postoperative pain, bleeding, bruising, loss of range of motion, continued knee pain, revision surgery, infection, DVT, PE, MI, CVA and other unforeseen events that could occur in a perioperative fashion.  The patient understood metal and plastic will be implanted to body.  The patient understood a significant amount of physical therapy would be required as well.  Understanding all the risks and benefits as

## 2024-02-08 NOTE — PERIOP NOTE
TRANSFER - OUT REPORT:    Verbal report given to IVAN Fuchs(name) on Chuy Krause  being transferred to 572(unit) for routine post-op       Report consisted of patient’s Situation, Background, Assessment and   Recommendations(SBAR).     Time Pre op antibiotic given:1125  Anesthesia Stop time: 1335    Information from the following report(s) SBAR, Procedure Summary, Intake/Output, and MAR was reviewed with the receiving nurse.    Opportunity for questions and clarification was provided.     Is the patient on 02? No           Is the patient on a monitor? No    Is the nurse transporting with the patient? No    Surgical Waiting Area notified of patient's transfer from PACU? Yes    Belongings bags x 2, walker and cane sent up with patient

## 2024-02-08 NOTE — PROGRESS NOTES
Ortho NP Note    POD# 0  s/p LEFT TOTAL KNEE ARTHROPLASTY (GEN/BLOCK)   Pt seen with no visitor     Pt assisted from stretcher to ortho bed.   Awake and alert, in NAD  Reports BLE numbness, no motor return yet s/p spinal block   Denies pain and nausea.   Incontinent of urine with log rolling. Bladder scan shows > 700 .     VSS Afebrile.    Visit Vitals  /72   Pulse 56   Temp 97.5 °F (36.4 °C) (Oral)   Resp 14   Ht 1.829 m (6')   Wt 103.4 kg (228 lb)   SpO2 96%   BMI 30.92 kg/m²       Voiding status: straight cath now per protocol          Labs    Lab Results   Component Value Date/Time    HGB 16.6 02/01/2024 10:29 AM      Lab Results   Component Value Date/Time    INR 1.0 02/01/2024 10:29 AM      No results found for: \"NA\", \"K\", \"CL\", \"CO2\", \"GLU\", \"BUN\", \"CREA\", \"CA\"  Recent Glucose Results: No results found for: \"GLUCOSE\"        Body mass index is 30.92 kg/m². : A BMI > 30 is classified as obesity and > 40 is classified as morbid obesity.       Ace wrap dressing c.d.i  Cryotherapy in place over incision  Calves soft and supple; No pain with passive stretch  BLE numbness no motor   SCDs for mechanical DVT proph while in bed     PLAN:  1) PT BID - WBAT  2) Coumadin for DVT Prophylaxis. Encouraged early mobilization, bed exercises, and SCD use.  3) GI Prophylaxis - Pepcid  4) Pain control - scheduled tylenol , and prn  oxycodone  .  5) Post op care - Continue bowel regimen, encouraged IS.  Aquacel to remain in place x 7 days unless integrity is lost.   6) Readniess for discharge:     [x] Vital Signs stable    [] Hgb stable    [] + Voiding    [x] Wound intact, drainage minimal    [x] Tolerating PO intake     [] Cleared by PT (OT if applicable)     [] Stair training completed (if applicable)    [] Independent / Contact Guard Assist (household distance)     [] Bed mobility     [] Car transfers     [] ADL’s    [] Adequate pain control on oral medication alone         COLETTE Galicia - NP  Available via [employer]

## 2024-02-08 NOTE — PROGRESS NOTES
Pharmacist Note - Warfarin Dosing  Consult provided for this 73 y.o. male to manage warfarin for VTE Ppx s/p LEFT TOTAL KNEE ARTHROPLASTY     INR Goal: 1.7- 2.2  Therapy Day: 1  Preop Dose: Hu- none    Drugs that may increase INR: None  Drugs that may decrease INR: None  Other current anticoagulants/ drugs that may increase bleeding risk: None  Risk factors: Age > 65  Daily INR ordered: YES    No results for input(s): \"HGB\", \"INR\" in the last 72 hours.    CBC (brief) + INR Trend  Recent Labs     02/01/24  1029   HGB 16.6   HCT 46.9      INR 1.0     Date               INR                 Dose  2/01  1.0  ---  2/07  ---  None  2/08  ---  7.5 mg    Assessment/ Plan:  Will order warfarin 7.5 mg PO x 1 dose.    Pharmacy will continue to monitor daily and adjust therapy as indicated.     Marquise Hernandez, PharmD  Clinical Pharmacist, Orthopedics and Med/Surg  [facility] ([phone])

## 2024-02-08 NOTE — ANESTHESIA PROCEDURE NOTES
Spinal Block    Patient location during procedure: OR  Reason for block: primary anesthetic and at surgeon's request  Staffing  Performed by: Niya Crespo DO  Authorized by: Niya Crespo DO    Spinal Block  Patient position: sitting  Prep: ChloraPrep and site prepped and draped  Patient monitoring: cardiac monitor, continuous pulse ox, continuous capnometry, frequent blood pressure checks and oxygen  Approach: midline  Location: L3/L4  Provider prep: sterile gloves and mask  Needle  Needle gauge: 22 G  Assessment  Attempts: 2  Hemodynamics: stable

## 2024-02-08 NOTE — ANESTHESIA POSTPROCEDURE EVALUATION
Department of Anesthesiology  Postprocedure Note    Patient: Chuy Krause  MRN: [id number]  YOB: 1950  Date of evaluation: 2/8/2024    Procedure Summary       Date: 02/08/24 Room / Location: [facility] MAIN OR [address] [place] [address] MAIN OR    Anesthesia Start: 1058 Anesthesia Stop: 1335    Procedure: LEFT TOTAL KNEE ARTHROPLASTY (GEN/BLOCK) (Left: Knee) Diagnosis:       Primary osteoarthritis of left knee      (Primary osteoarthritis of left knee [M17.12])    Providers: Ashley Hu MD Responsible Provider: Mack Schneider MD    Anesthesia Type: MAC, Spinal, Regional ASA Status: 2            Anesthesia Type: MAC, Spinal, Regional    Michaela Phase I: Michaela Score: 9    Michaela Phase II:      Anesthesia Post Evaluation    Patient location during evaluation: PACU  Patient participation: complete - patient participated  Level of consciousness: awake  Airway patency: patent  Nausea & Vomiting: no nausea  Cardiovascular status: blood pressure returned to baseline and hemodynamically stable  Respiratory status: acceptable  Hydration status: stable  Multimodal analgesia pain management approach    No notable events documented.

## 2024-02-08 NOTE — BRIEF OP NOTE
Brief Postoperative Note      Patient: Chuy Kraues  YOB: 1950  MRN: [id number]    Date of Procedure: 2/8/2024    Pre-Op Diagnosis Codes:     * Primary osteoarthritis of left knee [M17.12]    Post-Op Diagnosis: Same       Procedure(s):  LEFT TOTAL KNEE ARTHROPLASTY (GEN/BLOCK)    Surgeon(s):  Jim Martinez MD Wolfe, Shannon, MD    Assistant:  Physician Assistant: Oneyda Gallagher PA-C    Anesthesia: Spinal with mac    Estimated Blood Loss (mL): 200     Complications: None    Specimens:   Bone discarded    Implants:  Implant Name Type Inv. Item Serial No.  Lot No. LRB No. Used Action   CEMENT BNE MED VISC 80 GM GENTAMICIN PALACOS MV+G PRO - SNA  CEMENT BNE MED VISC 80 GM GENTAMICIN PALACOS MV+G PRO NA [employer] [id number] Left 1 Implanted   COMPONENT PAT 38MM 3 PEG 3 RND MALIK STD BILLY NP W/O XR WIRE - [id number]  COMPONENT PAT 38MM 3 PEG 3 RND MALIK STD BILLY NP W/O XR WIRE [id number] [employer] N/A Left 1 Implanted   INSERT TIB SZ 45F 45T BILLY FIT TRULIANT - [id number]  INSERT TIB SZ 45F 45T BILLY FIT TRULIANT [id number] [employer] N/A Left 1 Implanted   COMPONENT FEM CR 4.5 LT KNEE BILLY TRULIANT - [id number]  COMPONENT FEM CR 4.5 LT KNEE BILLY TRULIANT [id number] [employer] N/A Left 1 Implanted   INSERT TIB CR 4.5 15 MM TRULIANT - [id number]  INSERT TIB CR 4.5 15 MM TRULIANT [id number] [employer] N/A Left 1 Implanted         Findings: see note      Electronically signed by Ashley Hu MD on 2/8/2024 at 1:16 PM

## 2024-02-08 NOTE — ANESTHESIA PROCEDURE NOTES
Peripheral Block    Patient location during procedure: pre-op  Reason for block: post-op pain management and at surgeon's request  Start time: 2/8/2024 10:51 AM  Staffing  Performed: anesthesiologist   Performed by: Niya Crespo DO  Authorized by: Niya Crespo DO    Preanesthetic Checklist  Completed: patient identified, IV checked, site marked, risks and benefits discussed, surgical/procedural consents, equipment checked, pre-op evaluation, timeout performed, anesthesia consent given, oxygen available, monitors applied/VS acknowledged and fire risk safety assessment completed and verbalized  Peripheral Block   Patient position: supine  Prep: ChloraPrep  Provider prep: mask and sterile gloves  Patient monitoring: cardiac monitor, continuous pulse ox, frequent blood pressure checks, IV access and oxygen  Block type: Saphenous  Laterality: left  Injection technique: single-shot  Guidance: ultrasound guided  Local infiltration: ropivacaine  Infiltration strength: 0.5 %  Local infiltration: ropivacaineDose: 30 mL    Needle   Needle type: insulated echogenic nerve stimulator needle   Needle gauge: 21 G  Needle localization: anatomical landmarks and ultrasound guidance  Needle length: 10 cm  Assessment   Injection assessment: negative aspiration for heme, no paresthesia on injection, local visualized surrounding nerve on ultrasound and no intravascular symptoms  Paresthesia pain: none  Slow fractionated injection: yes  Hemodynamics: stable  Real-time US image taken/store: yes  Outcomes: uncomplicated and patient tolerated procedure well    Medications Administered  ropivacaine (NAROPIN) injection 0.5% - Perineural   20 mL - 2/8/2024 10:51:00 AM

## 2024-02-08 NOTE — DISCHARGE INSTRUCTIONS
[facility]  Total Knee Replacement   Post-Op Discharge Instructions Knee   Dr. Ashley Hu    Patient Name  Chuy Krause  Date of procedure  [unfilled]    Procedure  Procedure(s):  LEFT TOTAL KNEE ARTHROPLASTY (GEN/BLOCK)  Surgeon  Surgeon(s) and Role:     * Ashley Hu MD - Primary  PCP: @PCP@    Follow up care  Follow up visit with Dr. Hu in 2-3 weeks.  Call [phone], extension [phone] to make an appointment    Activity at home  Take a short walk every hour; except at night when sleeping  Do your Home Exercise Program 3 times every day   After exercising lie down and elevate your leg on pillows for 15-30 minutes to decrease swelling  Refer to your patient notebook for more information   Preventing blood clots  Take Warfarin (Coumadin) every day as prescribed.  Do not take aspirin or anti-inflammatory medicine while taking Warfarin  Wear elastic stockings (TEDS) for 4 weeks. You may remove them daily for 1 hour when shoering/sponge-bathing.  Call Dr. Hu if you have side effects of blood thinning medication: bleeding, bruising, upset stomach, or diarrhea.   Call Dr. Hu for signs of a blood clot in your leg: calf pain, tenderness, redness, swelling of lower leg   Preventing lung congestion  Use your incentive spirometer 4 times a day; do 10 repetitions each time  Remember to keep the small blue ball between the two arrows when taking a slow, deep breath   Pain Management  Get up and walk a short distance to relieve pain and stiffness.  Place ice wrap on your knee except when you are walking. The gel ice packs should be changed about every 4 hours  Elevate your leg on pillows for 15-30 minutes   If needed, take a narcotic pain pill every 4-6 hours as prescribed.  Take all medications with a small amount of food.   As your pain decreases, take the narcotics less often or take ½ of a pill  Call Dr. Hu if you have side effects from your narcotic pain medication: itching,

## 2024-02-09 LAB
ANION GAP SERPL CALC-SCNC: 2 MMOL/L (ref 5–15)
BUN SERPL-MCNC: 14 MG/DL (ref 6–20)
BUN/CREAT SERPL: 14 (ref 12–20)
CALCIUM SERPL-MCNC: 8.7 MG/DL (ref 8.5–10.1)
CHLORIDE SERPL-SCNC: 108 MMOL/L (ref 97–108)
CO2 SERPL-SCNC: 28 MMOL/L (ref 21–32)
CREAT SERPL-MCNC: 1.02 MG/DL (ref 0.7–1.3)
GLUCOSE SERPL-MCNC: 117 MG/DL (ref 65–100)
HCT VFR BLD AUTO: 36.1 % (ref 36.6–50.3)
HGB BLD-MCNC: 12.5 G/DL (ref 12.1–17)
INR PPP: 1.1 (ref 0.9–1.1)
POTASSIUM SERPL-SCNC: 3.8 MMOL/L (ref 3.5–5.1)
PROTHROMBIN TIME: 11.6 SEC (ref 9–11.1)
SODIUM SERPL-SCNC: 138 MMOL/L (ref 136–145)

## 2024-02-09 PROCEDURE — 6370000000 HC RX 637 (ALT 250 FOR IP): Performed by: ORTHOPAEDIC SURGERY

## 2024-02-09 PROCEDURE — 97530 THERAPEUTIC ACTIVITIES: CPT

## 2024-02-09 PROCEDURE — 85018 HEMOGLOBIN: CPT

## 2024-02-09 PROCEDURE — G0378 HOSPITAL OBSERVATION PER HR: HCPCS

## 2024-02-09 PROCEDURE — 6360000002 HC RX W HCPCS: Performed by: ORTHOPAEDIC SURGERY

## 2024-02-09 PROCEDURE — 85014 HEMATOCRIT: CPT

## 2024-02-09 PROCEDURE — APPNB30 APP NON BILLABLE TIME 0-30 MINS: Performed by: NURSE PRACTITIONER

## 2024-02-09 PROCEDURE — 80048 BASIC METABOLIC PNL TOTAL CA: CPT

## 2024-02-09 PROCEDURE — 2580000003 HC RX 258: Performed by: ORTHOPAEDIC SURGERY

## 2024-02-09 PROCEDURE — 36415 COLL VENOUS BLD VENIPUNCTURE: CPT

## 2024-02-09 PROCEDURE — 85610 PROTHROMBIN TIME: CPT

## 2024-02-09 RX ORDER — OXYCODONE HYDROCHLORIDE 5 MG/1
10 TABLET ORAL EVERY 4 HOURS PRN
Status: DISCONTINUED | OUTPATIENT
Start: 2024-02-09 | End: 2024-02-13 | Stop reason: HOSPADM

## 2024-02-09 RX ORDER — TRAMADOL HYDROCHLORIDE 50 MG/1
50 TABLET ORAL EVERY 6 HOURS PRN
Status: DISCONTINUED | OUTPATIENT
Start: 2024-02-09 | End: 2024-02-09

## 2024-02-09 RX ADMIN — OXYCODONE 5 MG: 5 TABLET ORAL at 17:22

## 2024-02-09 RX ADMIN — FAMOTIDINE 20 MG: 20 TABLET ORAL at 09:53

## 2024-02-09 RX ADMIN — ACETAMINOPHEN 650 MG: 325 TABLET ORAL at 06:40

## 2024-02-09 RX ADMIN — METFORMIN HYDROCHLORIDE 1000 MG: 500 TABLET ORAL at 06:40

## 2024-02-09 RX ADMIN — ACETAMINOPHEN 650 MG: 325 TABLET ORAL at 09:53

## 2024-02-09 RX ADMIN — SENNOSIDES 8.6 MG: 8.6 TABLET, FILM COATED ORAL at 21:12

## 2024-02-09 RX ADMIN — WARFARIN SODIUM 7.5 MG: 2.5 TABLET ORAL at 14:50

## 2024-02-09 RX ADMIN — OXYCODONE 10 MG: 5 TABLET ORAL at 06:40

## 2024-02-09 RX ADMIN — SODIUM CHLORIDE, PRESERVATIVE FREE 10 ML: 5 INJECTION INTRAVENOUS at 11:00

## 2024-02-09 RX ADMIN — SODIUM CHLORIDE, PRESERVATIVE FREE 10 ML: 5 INJECTION INTRAVENOUS at 21:19

## 2024-02-09 RX ADMIN — FAMOTIDINE 20 MG: 20 TABLET ORAL at 21:12

## 2024-02-09 RX ADMIN — WATER 2000 MG: 1 INJECTION INTRAMUSCULAR; INTRAVENOUS; SUBCUTANEOUS at 02:37

## 2024-02-09 RX ADMIN — ACETAMINOPHEN 650 MG: 325 TABLET ORAL at 21:13

## 2024-02-09 RX ADMIN — OXYCODONE 10 MG: 5 TABLET ORAL at 10:52

## 2024-02-09 RX ADMIN — ACETAMINOPHEN 650 MG: 325 TABLET ORAL at 17:22

## 2024-02-09 RX ADMIN — OXYCODONE 10 MG: 5 TABLET ORAL at 02:32

## 2024-02-09 ASSESSMENT — PAIN SCALES - GENERAL
PAINLEVEL_OUTOF10: 8
PAINLEVEL_OUTOF10: 7
PAINLEVEL_OUTOF10: 6
PAINLEVEL_OUTOF10: 8
PAINLEVEL_OUTOF10: 6
PAINLEVEL_OUTOF10: 6

## 2024-02-09 ASSESSMENT — PAIN DESCRIPTION - PAIN TYPE
TYPE: SURGICAL PAIN

## 2024-02-09 ASSESSMENT — PAIN DESCRIPTION - DESCRIPTORS
DESCRIPTORS: ACHING

## 2024-02-09 ASSESSMENT — PAIN DESCRIPTION - ORIENTATION
ORIENTATION: LEFT

## 2024-02-09 ASSESSMENT — PAIN - FUNCTIONAL ASSESSMENT
PAIN_FUNCTIONAL_ASSESSMENT: PREVENTS OR INTERFERES SOME ACTIVE ACTIVITIES AND ADLS
PAIN_FUNCTIONAL_ASSESSMENT: PREVENTS OR INTERFERES SOME ACTIVE ACTIVITIES AND ADLS

## 2024-02-09 ASSESSMENT — PAIN DESCRIPTION - LOCATION
LOCATION: KNEE
LOCATION: KNEE;INCISION
LOCATION: KNEE
LOCATION: KNEE

## 2024-02-09 ASSESSMENT — PAIN DESCRIPTION - FREQUENCY: FREQUENCY: INTERMITTENT

## 2024-02-09 ASSESSMENT — PAIN DESCRIPTION - ONSET: ONSET: GRADUAL

## 2024-02-09 NOTE — CARE COORDINATION
Transition of Care Plan:  [facility] accepted for PT and SN. No DME needed. Wife to transport when ready for d/c.    CM met with Pt, confirmed face sheet. Pt was lethargic and struggling to stay awake. Pt would like HH and had no preferred provider nor history of HH. Pt reports that wife will transport home. His prior PCP retired and he now uses Dr. Sukhdev Mooney at [facility]. CM called wife to confirm any choice for HH. She reports no preference nor history of HH.     Caregiver Contact: Jodie Krause wife [phone]    RUR: obs  Prior Level of Functioning: indep  Disposition: home  If SNF or IPR: Date FOC offered:   Date FOC received:   Accepting facility:   Date authorization started with reference number:   Date authorization received and expires:   Follow up appointments:   DME needed: none  Transportation at discharge: wife  IM/IMM Medicare/ letter given: observ  Is patient a [place] and connected with [facility]?    If yes, was [place] transfer form completed and [facility] notified?   Caregiver Contact: Jodie valenzuela [phone]  Discharge Caregiver contacted prior to discharge? y  Care Conference needed?   Barriers to discharge:        Care Management Initial Assessment       RUR:obs  Readmission? No  1st IM letter given? N/a obs  1st  letter given: nA   02/09/24 1144   Service Assessment   Patient Orientation Other (see comment)  (Oriented but sleepy/lethargic; challenged to keep eyes open.)   History Provided By Patient   Support Systems Spouse/Significant Other   PCP Verified by CM Yes  (Pt's prior PCP retired, now is Dr. Sukhdev Mooney)   Prior Functional Level Independent in ADLs/IADLs   Can patient return to prior living arrangement Yes   Family able to assist with home care needs: Yes   Social/Functional History   Lives With Spouse   Condition of Participation: Discharge Planning   The Plan for Transition of Care is related to the following treatment goals: Home Health   The Patient  and/or Patient Representative was provided with a Choice of Provider? Patient   The Patient and/Or Patient Representative agree with the Discharge Plan? Yes   Freedom of Choice list was provided with basic dialogue that supports the patient's individualized plan of care/goals, treatment preferences, and shares the quality data associated with the providers?  Yes

## 2024-02-09 NOTE — PROGRESS NOTES
POD 1 Day Post-Op    Procedure:  Procedure(s):  LEFT TOTAL KNEE ARTHROPLASTY (GEN/BLOCK)    Subjective:     Patient has complaints of pain. He is due for his oral pain medications.  He has not had any IV pain meds. He did have trouble sleeping overnight.  Tolerating PO diet. The nerve block wore off prior to bedtime last night. No cp/sob/n/v/f/c. Voiding.      Objective:     Blood pressure 125/73, pulse 88, temperature 98.2 °F (36.8 °C), temperature source Oral, resp. rate 16, height 1.829 m (6'), weight 103.4 kg (228 lb), SpO2 95 %.  Temp (24hrs), Av.1 °F (36.7 °C), Min:97.5 °F (36.4 °C), Max:98.4 °F (36.9 °C)       Physical Exam:  Examination of the left knee reveals that the dressing is clean and intact. Sensation is intact to light touch. Fire ehl/fhl/ta/gs. Brisk capillary refill. Palpable pt pulse. TTP at tourniquet site and to knee    Labs:   Lab Results   Component Value Date/Time    HGB 16.6 2024 10:29 AM    INR 1.0 2024 10:29 AM         Assessment:     Principal Problem:    Osteoarthritis of left knee, unspecified osteoarthritis type  Resolved Problems:    * No resolved hospital problems. *      Plan/Recommendations/Medical Decision Making:     WBAT  Continue physical therapy  Ice and elevate  Begin discharge planning, possible DC home today

## 2024-02-09 NOTE — PLAN OF CARE
Problem: Physical Therapy - Adult  Goal: By Discharge: Performs mobility at highest level of function for planned discharge setting.  See evaluation for individualized goals.  Description: FUNCTIONAL STATUS PRIOR TO ADMISSION: Patient was modified independent using a single point cane for functional mobility.    HOME SUPPORT PRIOR TO ADMISSION: The patient lived with wife but did not require assistance.    Physical Therapy Goals  Initiated 2/8/2024  1.  Patient will move from supine to sit and sit to supine and scoot up and down in bed with modified independence within 4 day(s).    2.  Patient will perform sit to stand with modified independence within 4 day(s).  3.  Patient will transfer from bed to chair and chair to bed with modified independence using the least restrictive device within 4 day(s).  4.  Patient will ambulate with modified independence for > 150 feet with the least restrictive device within 4 day(s).   5.  Patient will ascend/descend 4 stairs with one handrail(s) with supervision within 4 day(s).  6. Patient will perform TKR home exercise program per protocol with supervision/set-up within 4 days.  7. Patient will demonstrate AROM 0-90 degrees in operative joint within 4 days.     Outcome: Progressing   PHYSICAL THERAPY TREATMENT    Patient: Chuy Krause (73 y.o. male)  Date: 2/9/2024  Diagnosis: Primary osteoarthritis of left knee [M17.12]  Osteoarthritis of left knee, unspecified osteoarthritis type [M17.12] Osteoarthritis of left knee, unspecified osteoarthritis type  Procedure(s) (LRB):  LEFT TOTAL KNEE ARTHROPLASTY (GEN/BLOCK) (Left) 1 Day Post-Op  Precautions: Weight Bearing, Fall Risk   Left Lower Extremity Weight Bearing: Weight Bearing As Tolerated                  ASSESSMENT:  Patient continues to benefit from skilled PT services and is not progressing towards goals. Pt very drowsy am  - alerts to names but requires continuous stimulation to maintain eyes open.  Pt is oriented x 4  x3      COMMUNICATION/EDUCATION:   The patient's plan of care was discussed with: registered nurse and NP - Roxy Perez.           Scarlett Morales, PT  Minutes: 40

## 2024-02-09 NOTE — PROGRESS NOTES
Pt POD 0. S/P Left total knee arthroplasty. Ambulated to bathroom by this nurse with walker. Tolerated with no acute distress noted.

## 2024-02-09 NOTE — PROGRESS NOTES
Received call from nurse with concerns for patient's drowsiness and confusion. Unable to stay awake in conversation, able to sit on EOB with PT however further mobility limited due to drowsiness and falling asleep. Wife denies hx of confusion PTA. VSS. Receiving oxycodone 10 mg for postop knee pain. Reports little sleep overnight. Discussed with Dr. Martinez, Dr. Hu would like to continue oxycodone 10 mg for now and reports hx dementia.     Roxy Perez, COLETTE - NP

## 2024-02-09 NOTE — PROGRESS NOTES
Pharmacist Note - Warfarin Dosing  Consult provided for this 73 y.o. male to manage warfarin for VTE Ppx s/p LEFT TOTAL KNEE ARTHROPLASTY     INR Goal: 1.7- 2.2  Therapy Day: 2    Drugs that may increase INR: None  Drugs that may decrease INR: None  Other current anticoagulants/ drugs that may increase bleeding risk: None  Risk factors: Age > 65  Daily INR ordered: YES    Recent Labs     02/09/24  1057   HGB 12.5   INR 1.1       CBC (brief) + INR Trend  Recent Labs     02/09/24  1057 02/01/24  1029   HGB 12.5 16.6   HCT 36.1* 46.9   PLT  --  223   INR 1.1 1.0     Date               INR                 Dose  2/08  ---  7.5 mg  2/09  1.1  7.5 mg    Assessment/ Plan:  Will order warfarin 7.5 mg PO x 1 dose.    Pharmacy will continue to monitor daily and adjust therapy as indicated.       Marquise Hernandez, PharmD  Clinical Pharmacist, Orthopedics and Med/Surg  [facility] ([phone])

## 2024-02-10 LAB
INR PPP: 1.4 (ref 0.9–1.1)
PROTHROMBIN TIME: 14.1 SEC (ref 9–11.1)

## 2024-02-10 PROCEDURE — G0378 HOSPITAL OBSERVATION PER HR: HCPCS

## 2024-02-10 PROCEDURE — 2580000003 HC RX 258: Performed by: ORTHOPAEDIC SURGERY

## 2024-02-10 PROCEDURE — 97530 THERAPEUTIC ACTIVITIES: CPT

## 2024-02-10 PROCEDURE — 85610 PROTHROMBIN TIME: CPT

## 2024-02-10 PROCEDURE — 1100000000 HC RM PRIVATE

## 2024-02-10 PROCEDURE — 36415 COLL VENOUS BLD VENIPUNCTURE: CPT

## 2024-02-10 PROCEDURE — 6370000000 HC RX 637 (ALT 250 FOR IP): Performed by: ORTHOPAEDIC SURGERY

## 2024-02-10 RX ORDER — WARFARIN SODIUM 2.5 MG/1
2.5 TABLET ORAL DAILY
Qty: 30 TABLET | Refills: 3 | Status: SHIPPED | OUTPATIENT
Start: 2024-02-10

## 2024-02-10 RX ORDER — OXYCODONE HYDROCHLORIDE 5 MG/1
2.5 TABLET ORAL EVERY 4 HOURS PRN
Status: DISCONTINUED | OUTPATIENT
Start: 2024-02-10 | End: 2024-02-13 | Stop reason: HOSPADM

## 2024-02-10 RX ORDER — WARFARIN SODIUM 5 MG/1
5 TABLET ORAL
Status: COMPLETED | OUTPATIENT
Start: 2024-02-10 | End: 2024-02-10

## 2024-02-10 RX ADMIN — OXYCODONE 5 MG: 5 TABLET ORAL at 06:32

## 2024-02-10 RX ADMIN — FAMOTIDINE 20 MG: 20 TABLET ORAL at 21:52

## 2024-02-10 RX ADMIN — ACETAMINOPHEN 650 MG: 325 TABLET ORAL at 12:09

## 2024-02-10 RX ADMIN — ACETAMINOPHEN 650 MG: 325 TABLET ORAL at 03:30

## 2024-02-10 RX ADMIN — FAMOTIDINE 20 MG: 20 TABLET ORAL at 12:10

## 2024-02-10 RX ADMIN — SODIUM CHLORIDE, PRESERVATIVE FREE 10 ML: 5 INJECTION INTRAVENOUS at 12:10

## 2024-02-10 RX ADMIN — WARFARIN SODIUM 5 MG: 5 TABLET ORAL at 12:10

## 2024-02-10 RX ADMIN — ACETAMINOPHEN 650 MG: 325 TABLET ORAL at 16:28

## 2024-02-10 RX ADMIN — SENNOSIDES 8.6 MG: 8.6 TABLET, FILM COATED ORAL at 21:52

## 2024-02-10 RX ADMIN — METFORMIN HYDROCHLORIDE 1000 MG: 500 TABLET ORAL at 06:32

## 2024-02-10 RX ADMIN — ACETAMINOPHEN 650 MG: 325 TABLET ORAL at 21:52

## 2024-02-10 ASSESSMENT — PAIN DESCRIPTION - ONSET: ONSET: GRADUAL

## 2024-02-10 ASSESSMENT — PAIN DESCRIPTION - LOCATION
LOCATION: INCISION;KNEE
LOCATION: KNEE
LOCATION: KNEE

## 2024-02-10 ASSESSMENT — PAIN SCALES - GENERAL
PAINLEVEL_OUTOF10: 5
PAINLEVEL_OUTOF10: 4
PAINLEVEL_OUTOF10: 1

## 2024-02-10 ASSESSMENT — PAIN DESCRIPTION - DESCRIPTORS
DESCRIPTORS: ACHING;THROBBING
DESCRIPTORS: ACHING;SORE
DESCRIPTORS: ACHING

## 2024-02-10 ASSESSMENT — PAIN DESCRIPTION - ORIENTATION
ORIENTATION: LEFT

## 2024-02-10 ASSESSMENT — PAIN DESCRIPTION - PAIN TYPE
TYPE: SURGICAL PAIN
TYPE: SURGICAL PAIN

## 2024-02-10 ASSESSMENT — PAIN - FUNCTIONAL ASSESSMENT: PAIN_FUNCTIONAL_ASSESSMENT: ACTIVITIES ARE NOT PREVENTED

## 2024-02-10 ASSESSMENT — PAIN DESCRIPTION - FREQUENCY: FREQUENCY: INTERMITTENT

## 2024-02-10 NOTE — CARE COORDINATION
Care Management Initial Assessment       RUR: N/A Observation  Readmission? No  1st IM letter given: Yes 2/10/2024   1st  letter given: No       02/10/24 1048   Service Assessment   Patient Orientation Alert and Oriented   Cognition Alert   History Provided By Patient   Primary Caregiver Self   Support Systems Spouse/Significant Other;Children;[place]   Patient's Healthcare Decision Maker is: Legal Next of Kin   PCP Verified by CM Yes   Last Visit to PCP Within last 3 months   Prior Functional Level Independent in ADLs/IADLs   Current Functional Level Independent in ADLs/IADLs   Can patient return to prior living arrangement Yes   Ability to make needs known: Good   Family able to assist with home care needs: Yes   Would you like for me to discuss the discharge plan with any other family members/significant others, and if so, who? Yes   Financial Resources Medicare   Social/Functional History   Lives With Spouse   Type of Home House   Home Layout Two level;Able to Live on Main level with bedroom/bathroom   Home Access Stairs to enter with rails   Entrance Stairs - Number of Steps 4   Entrance Stairs - Rails Left   Bathroom Shower/Tub Walk-in shower   Bathroom Equipment Built-in shower seat;Grab bars in shower   Bathroom Accessibility Walker accessible   Home Equipment Walker, rolling;Cane   Receives Help From Family   ADL Assistance Independent   Homemaking Assistance Independent   Homemaking Responsibilities No   Ambulation Assistance Independent   Transfer Assistance Independent   Active  Yes   Mode of Transportation Car   Occupation Retired   Discharge Planning   Type of Residence House   Living Arrangements Spouse/Significant Other   Current Services Prior To Admission None   Potential Assistance Needed N/A   DME Ordered? No   Potential Assistance Purchasing Medications No   Type of Home Care Services OT;PT;Nursing Services   Patient expects to be discharged to: House   One/Two Story

## 2024-02-10 NOTE — PLAN OF CARE
Problem: Physical Therapy - Adult  Goal: By Discharge: Performs mobility at highest level of function for planned discharge setting.  See evaluation for individualized goals.  Description: FUNCTIONAL STATUS PRIOR TO ADMISSION: Patient was modified independent using a single point cane for functional mobility.    HOME SUPPORT PRIOR TO ADMISSION: The patient lived with wife but did not require assistance.    Physical Therapy Goals  Initiated 2/8/2024  1.  Patient will move from supine to sit and sit to supine and scoot up and down in bed with modified independence within 4 day(s).    2.  Patient will perform sit to stand with modified independence within 4 day(s).  3.  Patient will transfer from bed to chair and chair to bed with modified independence using the least restrictive device within 4 day(s).  4.  Patient will ambulate with modified independence for > 150 feet with the least restrictive device within 4 day(s).   5.  Patient will ascend/descend 4 stairs with one handrail(s) with supervision within 4 day(s).  6. Patient will perform TKR home exercise program per protocol with supervision/set-up within 4 days.  7. Patient will demonstrate AROM 0-90 degrees in operative joint within 4 days.     2/10/2024 1450 by Lyle Luz PTA  Outcome: Not Progressing   PHYSICAL THERAPY TREATMENT    Patient: Chuy Krause (73 y.o. male)  Date: 2/10/2024  Diagnosis: Primary osteoarthritis of left knee [M17.12]  Osteoarthritis of left knee, unspecified osteoarthritis type [M17.12] Osteoarthritis of left knee, unspecified osteoarthritis type  Procedure(s) (LRB):  LEFT TOTAL KNEE ARTHROPLASTY (GEN/BLOCK) (Left) 2 Days Post-Op  Precautions: Weight Bearing, Fall Risk   Left Lower Extremity Weight Bearing: Weight Bearing As Tolerated                  ASSESSMENT:  Patient continues to benefit from skilled PT services and is not progressing towards goals. Pt received supine in bed, willing to work with therapy. Pt  continues to present with impaired cognition, with delayed response to situation. Pt continued be limited by poor safety awareness, impaired balance, impaired strength and impaired flexibility with LLE.     Pt able to tolerate bed mobility to L side EOB with VC for use of gait belt to assist LLE to EOB, pt continue to have heave L lateral and posterior lean with seated balance and unable to correct to midline. Pt needed Mod A for supine>sit with VC for seated scooting to HOB with min A and VC for forward leaning during scooting. Noted fatigue with activity and pt requested to lay back down, with min A and gait belt for LLE to bed. Pt able to tolerate PROM with LLE for knee ext/flex with noted increased pain with little mobility. Pt completed session supine in bed with all needs met the time, RN notified of session.          PLAN:  Patient continues to benefit from skilled intervention to address the above impairments.  Continue treatment per established plan of care.    Recommendation for discharge: (in order for the patient to meet his/her long term goals): Therapy up to 5 days/week in Skilled nursing facility    Other factors to consider for discharge: patient's current support system is unable to meet their requirements for physical assistance, poor safety awareness, impaired cognition, high risk for falls, not safe to be alone, and concern for safely navigating or managing the home environment    IF patient discharges home will need the following DME: continuing to assess with progress       SUBJECTIVE:   Patient stated, \"I have balance issues.\"    OBJECTIVE DATA SUMMARY:   Critical Behavior:  Orientation  Overall Orientation Status: Within Normal Limits  Orientation Level: Oriented X4  Cognition  Overall Cognitive Status: WNL    Functional Mobility Training:  Bed Mobility:  Bed Mobility Training  Bed Mobility Training: Yes  Overall Level of Assistance: Moderate assistance  Interventions: Safety awareness  training;Verbal cues  Rolling: Moderate assistance  Supine to Sit: Moderate assistance;Adaptive equipment  Sit to Supine: Minimum assistance;Adaptive equipment  Scooting: Stand-by assistance;Additional time  Transfers:  Transfer Training  Transfer Training: No  Overall Level of Assistance: Maximum assistance  Interventions: Safety awareness training;Verbal cues  Sit to Stand: Moderate assistance;Maximum assistance;Additional time  Stand to Sit: Maximum assistance  Stand Pivot Transfers: Total assistance;Maximum assistance;Assist X2  Tub/Shower Transfer: Assist X2;Maximum assistance;Total assistance  Balance:  Balance  Sitting: Impaired  Sitting - Static: Fair (occasional)  Sitting - Dynamic: Fair (occasional);Poor (constant support)  Standing: Impaired  Standing - Static: Poor  Standing - Dynamic: Poor               Pain Rating:  Not rated  Pain Intervention(s):       Activity Tolerance:   Fair  and requires rest breaks    After treatment:   Patient left in no apparent distress in bed, Call bell within reach, and Bed/ chair alarm activated      COMMUNICATION/EDUCATION:   The patient's plan of care was discussed with: registered nurse           Lyle Luz [facility] [place]  Minutes: 19

## 2024-02-10 NOTE — PLAN OF CARE
Problem: Physical Therapy - Adult  Goal: By Discharge: Performs mobility at highest level of function for planned discharge setting.  See evaluation for individualized goals.  Description: FUNCTIONAL STATUS PRIOR TO ADMISSION: Patient was modified independent using a single point cane for functional mobility.    HOME SUPPORT PRIOR TO ADMISSION: The patient lived with wife but did not require assistance.    Physical Therapy Goals  Initiated 2/8/2024  1.  Patient will move from supine to sit and sit to supine and scoot up and down in bed with modified independence within 4 day(s).    2.  Patient will perform sit to stand with modified independence within 4 day(s).  3.  Patient will transfer from bed to chair and chair to bed with modified independence using the least restrictive device within 4 day(s).  4.  Patient will ambulate with modified independence for > 150 feet with the least restrictive device within 4 day(s).   5.  Patient will ascend/descend 4 stairs with one handrail(s) with supervision within 4 day(s).  6. Patient will perform TKR home exercise program per protocol with supervision/set-up within 4 days.  7. Patient will demonstrate AROM 0-90 degrees in operative joint within 4 days.     2/10/2024 1450 by Lyle Luz, PTA  Outcome: Not Progressing  2/10/2024 1241 by Lyle Luz, PTA  Outcome: Progressing

## 2024-02-10 NOTE — PROGRESS NOTES
Pharmacist Note - Warfarin Dosing  Consult provided for this 73 y.o. male to manage warfarin for VTE Ppx s/p LEFT TOTAL KNEE ARTHROPLASTY     INR Goal: 1.7- 2.2  Therapy Day: 3    Drugs that may increase INR: None  Drugs that may decrease INR: None  Other current anticoagulants/ drugs that may increase bleeding risk: None  Risk factors: Age > 65  Daily INR ordered: YES    Recent Labs     02/09/24  1057 02/10/24  0652   HGB 12.5  --    INR 1.1 1.4*       CBC (brief) + INR Trend  Recent Labs     02/10/24  0652 02/09/24  1057 02/01/24  1029   HGB  --  12.5 16.6   HCT  --  36.1* 46.9   PLT  --   --  223   INR 1.4* 1.1 1.0     Date               INR                 Dose  2/08  ---  7.5 mg  2/09  1.1  7.5 mg  2/10  1.4  5 mg    Assessment/ Plan:  Day 3 of warfarin s/p orthopedic surgery.  Per warfarin dosing nomogram, for INR 1.4 to 1.6 on day 3 of therapy recommend 75% of initial dose.  Will order warfarin 5 mg PO x 1 dose.    Pharmacy will continue to monitor daily and adjust therapy as indicated.

## 2024-02-10 NOTE — PROGRESS NOTES
POD 2 Days Post-Op    Procedure:  Procedure(s):  LEFT TOTAL KNEE ARTHROPLASTY (GEN/BLOCK)    Subjective:     Patient resting comfortably in bed, had tylenol only for pain overnight. Tolerating diet. A&O x 3 this morning. No cp/sob/n/v/f/c. Voiding.      Objective:     Blood pressure (!) 126/94, pulse (!) 113, temperature 98.4 °F (36.9 °C), temperature source Oral, resp. rate 18, height 1.829 m (6'), weight 103.4 kg (228 lb), SpO2 95 %.  Temp (24hrs), Av.4 °F (36.9 °C), Min:98 °F (36.7 °C), Max:98.6 °F (37 °C)       Physical Exam:  Examination of the left knee reveals that the dressing is clean and intact. Sensation is intact to light touch. Fire ehl/fhl/ta/gs. Brisk capillary refill. Palpable pt pulse. TTP at tourniquet site and to knee    Labs:   Lab Results   Component Value Date/Time    HGB 12.5 2024 10:57 AM    INR 1.1 2024 10:57 AM         Assessment:     Principal Problem:    Osteoarthritis of left knee, unspecified osteoarthritis type  Resolved Problems:    * No resolved hospital problems. *      Plan/Recommendations/Medical Decision Making:     WBAT  Continue physical therapy  Ice and elevate  Coumadin daily per INR - pharmacy to order and dose daily.   Begin discharge planning, possible DC home today pending PT clearance and pain control  Oxycodone PRN meds adjusted to a scale for mild, mod, severe pending patients pain level to help with mentation as well as control pain.

## 2024-02-10 NOTE — PLAN OF CARE
Problem: Physical Therapy - Adult  Goal: By Discharge: Performs mobility at highest level of function for planned discharge setting.  See evaluation for individualized goals.  Description: FUNCTIONAL STATUS PRIOR TO ADMISSION: Patient was modified independent using a single point cane for functional mobility.    HOME SUPPORT PRIOR TO ADMISSION: The patient lived with wife but did not require assistance.    Physical Therapy Goals  Initiated 2/8/2024  1.  Patient will move from supine to sit and sit to supine and scoot up and down in bed with modified independence within 4 day(s).    2.  Patient will perform sit to stand with modified independence within 4 day(s).  3.  Patient will transfer from bed to chair and chair to bed with modified independence using the least restrictive device within 4 day(s).  4.  Patient will ambulate with modified independence for > 150 feet with the least restrictive device within 4 day(s).   5.  Patient will ascend/descend 4 stairs with one handrail(s) with supervision within 4 day(s).  6. Patient will perform TKR home exercise program per protocol with supervision/set-up within 4 days.  7. Patient will demonstrate AROM 0-90 degrees in operative joint within 4 days.     Outcome: Progressing    PHYSICAL THERAPY TREATMENT    Patient: Chuy Krause (73 y.o. male)  Date: 2/10/2024  Diagnosis: Primary osteoarthritis of left knee [M17.12]  Osteoarthritis of left knee, unspecified osteoarthritis type [M17.12] Osteoarthritis of left knee, unspecified osteoarthritis type  Procedure(s) (LRB):  LEFT TOTAL KNEE ARTHROPLASTY (GEN/BLOCK) (Left) 2 Days Post-Op  Precautions: Weight Bearing, Fall Risk   Left Lower Extremity Weight Bearing: Weight Bearing As Tolerated                  ASSESSMENT:  Patient continues to benefit from skilled PT services and is not progressing towards goals. Pt received seated EOB, reporting to want to use the bathroom. Pt willing to work with therapy, however continues

## 2024-02-11 LAB
GLUCOSE BLD STRIP.AUTO-MCNC: 112 MG/DL (ref 65–117)
GLUCOSE BLD STRIP.AUTO-MCNC: 124 MG/DL (ref 65–117)
INR PPP: 1.3 (ref 0.9–1.1)
PROTHROMBIN TIME: 13.5 SEC (ref 9–11.1)
SERVICE CMNT-IMP: ABNORMAL
SERVICE CMNT-IMP: NORMAL

## 2024-02-11 PROCEDURE — 2580000003 HC RX 258: Performed by: ORTHOPAEDIC SURGERY

## 2024-02-11 PROCEDURE — 6370000000 HC RX 637 (ALT 250 FOR IP): Performed by: ORTHOPAEDIC SURGERY

## 2024-02-11 PROCEDURE — 97530 THERAPEUTIC ACTIVITIES: CPT

## 2024-02-11 PROCEDURE — 82962 GLUCOSE BLOOD TEST: CPT

## 2024-02-11 PROCEDURE — 1100000000 HC RM PRIVATE

## 2024-02-11 PROCEDURE — 36415 COLL VENOUS BLD VENIPUNCTURE: CPT

## 2024-02-11 PROCEDURE — 85610 PROTHROMBIN TIME: CPT

## 2024-02-11 RX ADMIN — FAMOTIDINE 20 MG: 20 TABLET ORAL at 21:13

## 2024-02-11 RX ADMIN — ACETAMINOPHEN 650 MG: 325 TABLET ORAL at 07:40

## 2024-02-11 RX ADMIN — WARFARIN SODIUM 7.5 MG: 2.5 TABLET ORAL at 18:26

## 2024-02-11 RX ADMIN — METFORMIN HYDROCHLORIDE 1000 MG: 500 TABLET ORAL at 07:43

## 2024-02-11 RX ADMIN — ACETAMINOPHEN 650 MG: 325 TABLET ORAL at 15:05

## 2024-02-11 RX ADMIN — SODIUM CHLORIDE, PRESERVATIVE FREE 10 ML: 5 INJECTION INTRAVENOUS at 21:46

## 2024-02-11 RX ADMIN — SODIUM CHLORIDE, PRESERVATIVE FREE 10 ML: 5 INJECTION INTRAVENOUS at 09:49

## 2024-02-11 RX ADMIN — ACETAMINOPHEN 650 MG: 325 TABLET ORAL at 20:01

## 2024-02-11 RX ADMIN — SENNOSIDES 8.6 MG: 8.6 TABLET, FILM COATED ORAL at 21:13

## 2024-02-11 ASSESSMENT — PAIN DESCRIPTION - PAIN TYPE
TYPE: SURGICAL PAIN

## 2024-02-11 ASSESSMENT — PAIN DESCRIPTION - ORIENTATION
ORIENTATION: LEFT

## 2024-02-11 ASSESSMENT — PAIN SCALES - GENERAL
PAINLEVEL_OUTOF10: 4
PAINLEVEL_OUTOF10: 2

## 2024-02-11 ASSESSMENT — PAIN DESCRIPTION - LOCATION
LOCATION: KNEE
LOCATION: INCISION;KNEE

## 2024-02-11 ASSESSMENT — PAIN - FUNCTIONAL ASSESSMENT
PAIN_FUNCTIONAL_ASSESSMENT: ACTIVITIES ARE NOT PREVENTED
PAIN_FUNCTIONAL_ASSESSMENT: PREVENTS OR INTERFERES SOME ACTIVE ACTIVITIES AND ADLS

## 2024-02-11 ASSESSMENT — PAIN DESCRIPTION - DESCRIPTORS
DESCRIPTORS: ACHING;DISCOMFORT;BURNING
DESCRIPTORS: ACHING;DISCOMFORT
DESCRIPTORS: ACHING
DESCRIPTORS: ACHING;BURNING;DISCOMFORT

## 2024-02-11 ASSESSMENT — PAIN DESCRIPTION - FREQUENCY: FREQUENCY: INTERMITTENT

## 2024-02-11 ASSESSMENT — PAIN DESCRIPTION - ONSET: ONSET: GRADUAL

## 2024-02-11 NOTE — PROGRESS NOTES
Pharmacist Note - Warfarin Dosing  Consult provided for this 73 y.o. male to manage warfarin for VTE Ppx s/p LEFT TOTAL KNEE ARTHROPLASTY     INR Goal: 1.7- 2.2  Therapy Day: 4    Drugs that may increase INR: None  Drugs that may decrease INR: None  Other current anticoagulants/ drugs that may increase bleeding risk: None  Risk factors: Age > 65  Daily INR ordered: YES    Recent Labs     02/09/24  1057 02/10/24  0652 02/11/24  0725   HGB 12.5  --   --    INR 1.1 1.4* 1.3*      Date               INR                 Dose  2/08  ---  7.5 mg  2/09  1.1  7.5 mg  2/10  1.4  5 mg  2/11  1.3  7.5 mg    Assessment/ Plan:  Day 4 of warfarin s/p orthopedic surgery.  Per warfarin dosing nomogram, will order warfarin 7.5 mg PO x 1 dose.    Pharmacy will continue to monitor daily and adjust therapy as indicated.

## 2024-02-11 NOTE — PLAN OF CARE
Problem: Physical Therapy - Adult  Goal: By Discharge: Performs mobility at highest level of function for planned discharge setting.  See evaluation for individualized goals.  Description: FUNCTIONAL STATUS PRIOR TO ADMISSION: Patient was modified independent using a single point cane for functional mobility.    HOME SUPPORT PRIOR TO ADMISSION: The patient lived with wife but did not require assistance.    Physical Therapy Goals  Initiated 2/8/2024  1.  Patient will move from supine to sit and sit to supine and scoot up and down in bed with modified independence within 4 day(s).    2.  Patient will perform sit to stand with modified independence within 4 day(s).  3.  Patient will transfer from bed to chair and chair to bed with modified independence using the least restrictive device within 4 day(s).  4.  Patient will ambulate with modified independence for > 150 feet with the least restrictive device within 4 day(s).   5.  Patient will ascend/descend 4 stairs with one handrail(s) with supervision within 4 day(s).  6. Patient will perform TKR home exercise program per protocol with supervision/set-up within 4 days.  7. Patient will demonstrate AROM 0-90 degrees in operative joint within 4 days.     Outcome: Progressing   PHYSICAL THERAPY TREATMENT    Patient: Chuy Krause (73 y.o. male)  Date: 2/11/2024  Diagnosis: Primary osteoarthritis of left knee [M17.12]  Osteoarthritis of left knee, unspecified osteoarthritis type [M17.12] Osteoarthritis of left knee, unspecified osteoarthritis type  Procedure(s) (LRB):  LEFT TOTAL KNEE ARTHROPLASTY (GEN/BLOCK) (Left) 3 Days Post-Op  Precautions: Weight Bearing, Fall Risk   Left Lower Extremity Weight Bearing: Weight Bearing As Tolerated                  ASSESSMENT:  Patient continues to benefit from skilled PT services and is slowly progressing towards goals. Barriers to independence with functional mobility include symptomatic orthostatic hypotension, decreased safety  Continue to assess pending progress    Other factors to consider for discharge: poor safety awareness, high risk for falls, concern for safely navigating or managing the home environment, and symptomatic OH    IF patient discharges home will need the following DME: rolling walker       SUBJECTIVE:   Patient stated, \"Don't put that in there, I want to go home\" re low BP measurement    OBJECTIVE DATA SUMMARY:   Critical Behavior:  Orientation  Orientation Level: Oriented X4 (increased time to recall year)  Cognition  Overall Cognitive Status: Exceptions  Safety Judgement: Decreased awareness of need for assistance;Decreased awareness of need for safety  Insights: Decreased awareness of deficits    Functional Mobility Training:  Bed Mobility:  Bed Mobility Training  Bed Mobility Training: Yes  Sit to Supine: Minimum assistance (assist L LE)  Scooting: Stand-by assistance;Supervision  Transfers:  Transfer Training  Transfer Training: Yes  Interventions: Safety awareness training;Verbal cues;Tactile cues  Sit to Stand: Minimum assistance  Stand to Sit: Minimum assistance  Bed to Chair: Minimum assistance (stand pivot with RW, assist for balance, cues for sequence/ safety)  Balance:  Balance  Sitting: Intact  Standing: Impaired  Standing - Static: Constant support;Fair (impacted by symptomatic orthostasis)  Standing - Dynamic: Constant support;Fair                                                                                                                                                                                                                                 Intervention/Education specific to: \"knee replacement\"    Encouraged use of cryo therapy to aide in pain and edema control.              Therapeutic Exercises:     EXERCISE   Sets   Reps   Active Active Assist   Passive Self ROM   Comments   Ankle Pumps   [x]                                        []                                        []

## 2024-02-11 NOTE — PLAN OF CARE
Problem: Physical Therapy - Adult  Goal: By Discharge: Performs mobility at highest level of function for planned discharge setting.  See evaluation for individualized goals.  Description: FUNCTIONAL STATUS PRIOR TO ADMISSION: Patient was modified independent using a single point cane for functional mobility.    HOME SUPPORT PRIOR TO ADMISSION: The patient lived with wife but did not require assistance.    Physical Therapy Goals  Initiated 2/8/2024  1.  Patient will move from supine to sit and sit to supine and scoot up and down in bed with modified independence within 4 day(s).    2.  Patient will perform sit to stand with modified independence within 4 day(s).  3.  Patient will transfer from bed to chair and chair to bed with modified independence using the least restrictive device within 4 day(s).  4.  Patient will ambulate with modified independence for > 150 feet with the least restrictive device within 4 day(s).   5.  Patient will ascend/descend 4 stairs with one handrail(s) with supervision within 4 day(s).  6. Patient will perform TKR home exercise program per protocol with supervision/set-up within 4 days.  7. Patient will demonstrate AROM 0-90 degrees in operative joint within 4 days.     2/11/2024 1643 by Radha Lovell, PT  Outcome: Progressing  2/11/2024 1209 by Radha Lovell, PT  Outcome: Progressing   PHYSICAL THERAPY TREATMENT    Patient: Chuy Krause (73 y.o. male)  Date: 2/11/2024  Diagnosis: Primary osteoarthritis of left knee [M17.12]  Osteoarthritis of left knee, unspecified osteoarthritis type [M17.12] Osteoarthritis of left knee, unspecified osteoarthritis type  Procedure(s) (LRB):  LEFT TOTAL KNEE ARTHROPLASTY (GEN/BLOCK) (Left) 3 Days Post-Op  Precautions: Weight Bearing, Fall Risk   Left Lower Extremity Weight Bearing: Weight Bearing As Tolerated      ASSESSMENT:  Patient continues to benefit from skilled PT services and is slowly progressing towards goals. Pt received in bed, flat

## 2024-02-11 NOTE — PROGRESS NOTES
POD 3 Days Post-Op    Procedure:  Procedure(s):  LEFT TOTAL KNEE ARTHROPLASTY (GEN/BLOCK)    Subjective:     Patient resting comfortably in bed, had tylenol only for pain overnight. Trying to avoid oxycodone as able. Tolerating diet. Overall A&O x 3 this morning. No cp/sob/n/v/f/c. Voiding.      Objective:     Blood pressure 125/71, pulse 95, temperature 97.9 °F (36.6 °C), temperature source Oral, resp. rate 18, height 1.829 m (6'), weight 103.4 kg (228 lb), SpO2 96 %.  Temp (24hrs), Av °F (36.7 °C), Min:97.9 °F (36.6 °C), Max:98.1 °F (36.7 °C)       Physical Exam:  Examination of the left knee reveals that the dressing is clean and intact. Sensation is intact to light touch. Fire ehl/fhl/ta/gs. Brisk capillary refill. Palpable pt pulse. TTP at tourniquet site and to knee    Labs:   Lab Results   Component Value Date/Time    HGB 12.5 2024 10:57 AM    INR 1.4 02/10/2024 06:52 AM         Assessment:     Principal Problem:    Osteoarthritis of left knee, unspecified osteoarthritis type  Resolved Problems:    * No resolved hospital problems. *      Plan/Recommendations/Medical Decision Making:     WBAT  Continue physical therapy - goal to discuss PT session with PT today for assessment of progress with limited oxycodone. Patient wants to avoid SNF if at all possible.    Ice and elevate  Coumadin daily per INR - pharmacy to order and dose daily.   Continue with pain regimen as needed  Slow to progress with PT - recommends SNF vs home. Will discuss with CM to work on. Discussed with patient goals and possible discharge plans.

## 2024-02-12 LAB
GLUCOSE BLD STRIP.AUTO-MCNC: 105 MG/DL (ref 65–117)
GLUCOSE BLD STRIP.AUTO-MCNC: 109 MG/DL (ref 65–117)
GLUCOSE BLD STRIP.AUTO-MCNC: 113 MG/DL (ref 65–117)
GLUCOSE BLD STRIP.AUTO-MCNC: 148 MG/DL (ref 65–117)
INR PPP: 1.3 (ref 0.9–1.1)
PROTHROMBIN TIME: 13.1 SEC (ref 9–11.1)
SERVICE CMNT-IMP: ABNORMAL
SERVICE CMNT-IMP: NORMAL

## 2024-02-12 PROCEDURE — 97530 THERAPEUTIC ACTIVITIES: CPT | Performed by: PHYSICAL THERAPIST

## 2024-02-12 PROCEDURE — 97116 GAIT TRAINING THERAPY: CPT | Performed by: PHYSICAL THERAPIST

## 2024-02-12 PROCEDURE — 36415 COLL VENOUS BLD VENIPUNCTURE: CPT

## 2024-02-12 PROCEDURE — 97110 THERAPEUTIC EXERCISES: CPT | Performed by: PHYSICAL THERAPIST

## 2024-02-12 PROCEDURE — 82962 GLUCOSE BLOOD TEST: CPT

## 2024-02-12 PROCEDURE — 2580000003 HC RX 258: Performed by: ORTHOPAEDIC SURGERY

## 2024-02-12 PROCEDURE — 1100000000 HC RM PRIVATE

## 2024-02-12 PROCEDURE — 6370000000 HC RX 637 (ALT 250 FOR IP): Performed by: ORTHOPAEDIC SURGERY

## 2024-02-12 PROCEDURE — 85610 PROTHROMBIN TIME: CPT

## 2024-02-12 RX ADMIN — ACETAMINOPHEN 650 MG: 325 TABLET ORAL at 07:35

## 2024-02-12 RX ADMIN — WARFARIN SODIUM 7.5 MG: 2.5 TABLET ORAL at 11:00

## 2024-02-12 RX ADMIN — METFORMIN HYDROCHLORIDE 1000 MG: 500 TABLET ORAL at 07:35

## 2024-02-12 RX ADMIN — FAMOTIDINE 20 MG: 20 TABLET ORAL at 21:51

## 2024-02-12 RX ADMIN — ACETAMINOPHEN 650 MG: 325 TABLET ORAL at 04:15

## 2024-02-12 RX ADMIN — SODIUM CHLORIDE, PRESERVATIVE FREE 10 ML: 5 INJECTION INTRAVENOUS at 08:50

## 2024-02-12 RX ADMIN — ACETAMINOPHEN 650 MG: 325 TABLET ORAL at 21:20

## 2024-02-12 RX ADMIN — FAMOTIDINE 20 MG: 20 TABLET ORAL at 08:50

## 2024-02-12 RX ADMIN — SODIUM CHLORIDE, PRESERVATIVE FREE 10 ML: 5 INJECTION INTRAVENOUS at 21:03

## 2024-02-12 ASSESSMENT — PAIN DESCRIPTION - ORIENTATION
ORIENTATION: LEFT

## 2024-02-12 ASSESSMENT — PAIN DESCRIPTION - LOCATION
LOCATION: KNEE

## 2024-02-12 ASSESSMENT — PAIN SCALES - GENERAL
PAINLEVEL_OUTOF10: 1
PAINLEVEL_OUTOF10: 2
PAINLEVEL_OUTOF10: 3
PAINLEVEL_OUTOF10: 1
PAINLEVEL_OUTOF10: 4
PAINLEVEL_OUTOF10: 6
PAINLEVEL_OUTOF10: 2
PAINLEVEL_OUTOF10: 3
PAINLEVEL_OUTOF10: 3

## 2024-02-12 ASSESSMENT — PAIN DESCRIPTION - DESCRIPTORS
DESCRIPTORS: ACHING

## 2024-02-12 NOTE — PLAN OF CARE
Problem: Pain  Goal: Verbalizes/displays adequate comfort level or baseline comfort level  Outcome: Progressing     Problem: Discharge Planning  Goal: Discharge to home or other facility with appropriate resources  Outcome: Progressing     Problem: Safety - Adult  Goal: Free from fall injury  Outcome: Progressing     Problem: Physical Therapy - Adult  Goal: By Discharge: Performs mobility at highest level of function for planned discharge setting.  See evaluation for individualized goals.  Description: FUNCTIONAL STATUS PRIOR TO ADMISSION: Patient was modified independent using a single point cane for functional mobility.    HOME SUPPORT PRIOR TO ADMISSION: The patient lived with wife but did not require assistance.    Physical Therapy Goals  Initiated 2/8/2024  1.  Patient will move from supine to sit and sit to supine and scoot up and down in bed with modified independence within 4 day(s).    2.  Patient will perform sit to stand with modified independence within 4 day(s).  3.  Patient will transfer from bed to chair and chair to bed with modified independence using the least restrictive device within 4 day(s).  4.  Patient will ambulate with modified independence for > 150 feet with the least restrictive device within 4 day(s).   5.  Patient will ascend/descend 4 stairs with one handrail(s) with supervision within 4 day(s).  6. Patient will perform TKR home exercise program per protocol with supervision/set-up within 4 days.  7. Patient will demonstrate AROM 0-90 degrees in operative joint within 4 days.     2/11/2024 1643 by Radha Lovell, PT  Outcome: Progressing  2/11/2024 1209 by Radha Lovell, PT  Outcome: Progressing

## 2024-02-12 NOTE — PLAN OF CARE
Problem: Physical Therapy - Adult  Goal: By Discharge: Performs mobility at highest level of function for planned discharge setting.  See evaluation for individualized goals.  Description: FUNCTIONAL STATUS PRIOR TO ADMISSION: Patient was modified independent using a single point cane for functional mobility.    HOME SUPPORT PRIOR TO ADMISSION: The patient lived with wife but did not require assistance.    Physical Therapy Goals.  Goals reviewed 2/12/24.  Continue goals x 7 days  Initiated 2/8/2024  1.  Patient will move from supine to sit and sit to supine and scoot up and down in bed with modified independence within 4 day(s).    2.  Patient will perform sit to stand with modified independence within 4 day(s).  3.  Patient will transfer from bed to chair and chair to bed with modified independence using the least restrictive device within 4 day(s).  4.  Patient will ambulate with modified independence for > 150 feet with the least restrictive device within 4 day(s).   5.  Patient will ascend/descend 4 stairs with one handrail(s) with supervision within 4 day(s).  6. Patient will perform TKR home exercise program per protocol with supervision/set-up within 4 days.  7. Patient will demonstrate AROM 0-90 degrees in operative joint within 4 days.     2/12/2024 1502 by Guera Rodriguez, PT  Outcome: Progressing  2/12/2024 1300 by Guera Rodriguez, PT  Outcome: Progressing   PHYSICAL THERAPY TREATMENT    Patient: Chuy Krause (73 y.o. male)  Date: 2/12/2024  Diagnosis: Primary osteoarthritis of left knee [M17.12]  Osteoarthritis of left knee, unspecified osteoarthritis type [M17.12] Osteoarthritis of left knee, unspecified osteoarthritis type  Procedure(s) (LRB):  LEFT TOTAL KNEE ARTHROPLASTY (GEN/BLOCK) (Left) 4 Days Post-Op  Precautions: Weight Bearing, Fall Risk   Left Lower Extremity Weight Bearing: Weight Bearing As Tolerated                  ASSESSMENT:  Patient continues to benefit from skilled PT services

## 2024-02-12 NOTE — PLAN OF CARE
Problem: Pain  Goal: Verbalizes/displays adequate comfort level or baseline comfort level  2/12/2024 0952 by Lorena Hassan RN  Outcome: Progressing  2/11/2024 2201 by Promise Cyr LPN  Outcome: Progressing     Problem: Discharge Planning  Goal: Discharge to home or other facility with appropriate resources  2/12/2024 0952 by Lorena Hassan RN  Outcome: Progressing  2/11/2024 2201 by Promise Cyr LPN  Outcome: Progressing     Problem: Safety - Adult  Goal: Free from fall injury  2/12/2024 0952 by Lorena Hassan RN  Outcome: Progressing  2/11/2024 2201 by Promise Cyr LPN  Outcome: Progressing

## 2024-02-12 NOTE — PROGRESS NOTES
POD 4 Days Post-Op    Procedure:  Procedure(s):  LEFT TOTAL KNEE ARTHROPLASTY (GEN/BLOCK)    Subjective:     Patient has no complaints today. Pain has been controlled this morning.  Pt appears less confused today.  PT has been difficult at times but he has gotten up and walked to hallway.  Discussed DC home vs SNF with patient, he would prefer DC home but understands if DC to SNF is recommended. Tolerating PO diet.  Denies cp/sob/f/c.      Objective:     Blood pressure 127/75, pulse 75, temperature 98.6 °F (37 °C), temperature source Oral, resp. rate 18, height 1.829 m (6'), weight 103.4 kg (228 lb), SpO2 95 %.  Temp (24hrs), Av.5 °F (36.9 °C), Min:98.1 °F (36.7 °C), Max:98.9 °F (37.2 °C)       Physical Exam:  Examination of the left knee reveals that the dressing is clean and intact. Sensation is intact to light touch. Motor intact. Brisk capillary refill with palpable pt pulse.    Labs:   Lab Results   Component Value Date/Time    HGB 12.5 2024 10:57 AM    INR 1.3 2024 04:07 AM         Assessment:     Principal Problem:    Osteoarthritis of left knee, unspecified osteoarthritis type  Resolved Problems:    * No resolved hospital problems. *      Plan/Recommendations/Medical Decision Making:     WBAT LLE  DVT ppx  Pain control  Low dose narcotics as needed 2/2 delirium  Continue physical therapy  Ice and elevate  discharge planning, home vs SNF

## 2024-02-12 NOTE — PLAN OF CARE
Problem: Physical Therapy - Adult  Goal: By Discharge: Performs mobility at highest level of function for planned discharge setting.  See evaluation for individualized goals.  Description: FUNCTIONAL STATUS PRIOR TO ADMISSION: Patient was modified independent using a single point cane for functional mobility.    HOME SUPPORT PRIOR TO ADMISSION: The patient lived with wife but did not require assistance.    Physical Therapy Goals.  Goals reviewed 2/12/24.  Continue goals x 7 days  Initiated 2/8/2024  1.  Patient will move from supine to sit and sit to supine and scoot up and down in bed with modified independence within 4 day(s).    2.  Patient will perform sit to stand with modified independence within 4 day(s).  3.  Patient will transfer from bed to chair and chair to bed with modified independence using the least restrictive device within 4 day(s).  4.  Patient will ambulate with modified independence for > 150 feet with the least restrictive device within 4 day(s).   5.  Patient will ascend/descend 4 stairs with one handrail(s) with supervision within 4 day(s).  6. Patient will perform TKR home exercise program per protocol with supervision/set-up within 4 days.  7. Patient will demonstrate AROM 0-90 degrees in operative joint within 4 days.     Outcome: Progressing   PHYSICAL THERAPY TREATMENT: WEEKLY REASSESSMENT    Patient: Chuy Krause (73 y.o. male)  Date: 2/12/2024  Primary Diagnosis: Primary osteoarthritis of left knee [M17.12]  Osteoarthritis of left knee, unspecified osteoarthritis type [M17.12]  Procedure(s) (LRB):  LEFT TOTAL KNEE ARTHROPLASTY (GEN/BLOCK) (Left) 4 Days Post-Op   Precautions: Weight Bearing, Fall Risk   Left Lower Extremity Weight Bearing: Weight Bearing As Tolerated                  ASSESSMENT :  Patient continues to benefit from skilled PT services and is progressing towards goals. Patient continues with pain and limited mobility secondary to pain.  Entered patient room and bed  Training  Sit to Stand: Minimum assistance  Stand to Sit: Minimum assistance  Balance:               Balance  Sitting: Intact  Sitting - Static: Fair (occasional)  Sitting - Dynamic: Fair (occasional)  Standing: Impaired  Standing - Static: Constant support;Fair  Standing - Dynamic: Constant support;Fair  Ambulation/Gait Training:                       Gait  Overall Level of Assistance: Contact-guard assistance  Distance (ft): 30 Feet (x 2)  Assistive Device: Gait belt;Walker, rolling  Speed/Vianca: Slow;Pace decreased (< 100 feet/min)  Step Length: Right shortened;Left shortened  Swing Pattern: Left asymmetrical  Stance: Left decreased  Gait Abnormalities: Antalgic;Decreased step clearance;Step to gait                           Pain Ratin/10   Pain Intervention(s):       Activity Tolerance:   Fair  and requires rest breaks    After treatment:   Patient left in no apparent distress sitting up in chair, Call bell within reach, and Bed/ chair alarm activated    COMMUNICATION/EDUCATION:   The patient's plan of care was discussed with: physical therapist, occupational therapist, and registered nurse         Thank you for this referral.  Guera Rodriguez, PT  Minutes: 27

## 2024-02-12 NOTE — CARE COORDINATION
Transition of Care Plan:  SNF referral pending. [facility]st choice [facility] can accept pending bed availability. [facility] accepted for PT and SN. No DME needed.     CM met with Pt to confirm that he is in support of going to SNF. He confirms that he doesn't like the idea but he is willing to go. CM messaged [facility] for update. In order to achieve 3 night stay, Pt cannot d/c earlier than 2/13.     Caregiver Contact: Jodie Krause wife [phone]    RUR: 5%  Prior Level of Functioning: indep  Disposition: SNF  If SNF or IPR: Date FOC offered: 2/10  Date FOC received: 2/10  Accepting facility: [facility] pending bed availability  Date authorization started with reference number:   Date authorization received and expires:   Follow up appointments:   DME needed: none  Transportation at discharge: wife... tbd  IM/IMM Medicare/ letter given: observ  Is patient a  and connected with [facility]?    If yes, was  transfer form completed and [facility] notified?   Caregiver Contact: Jodie Krause wife [phone]  Discharge Caregiver contacted prior to discharge? y  Care Conference needed?   Barriers to discharge:  3rd night stay required      Care Management Initial Assessment       RUR:obs  Readmission? No  1st IM letter given? N/a obs  1st  letter given: nA   02/09/24 1144   Service Assessment   Patient Orientation Other (see comment)  (Oriented but sleepy/lethargic; challenged to keep eyes open.)   History Provided By Patient   Support Systems Spouse/Significant Other   PCP Verified by CM Yes  (Pt's prior PCP retired, now is Dr. Sukhdev Mooney)   Prior Functional Level Independent in ADLs/IADLs   Can patient return to prior living arrangement Yes   Family able to assist with home care needs: Yes   Social/Functional History   Lives With Spouse   Condition of Participation: Discharge Planning   The Plan for Transition of Care is related to the following treatment goals: Home Health   The Patient and/or

## 2024-02-12 NOTE — PROGRESS NOTES
Pharmacist Note - Warfarin Dosing  Consult provided for this 73 y.o. male to manage warfarin for VTE Ppx s/p LEFT TOTAL KNEE ARTHROPLASTY     INR Goal: 1.7- 2.2  Therapy Day: 5    Drugs that may increase INR: None  Drugs that may decrease INR: None  Other current anticoagulants/ drugs that may increase bleeding risk: None  Risk factors: Age > 65  Daily INR ordered: YES    Recent Labs     02/09/24  1057 02/10/24  0652 02/11/24  0725 02/12/24  0407   HGB 12.5  --   --   --    INR 1.1 1.4* 1.3* 1.3*      Date               INR                 Dose  2/08  ---  7.5 mg  2/09  1.1  7.5 mg  2/10  1.4  5 mg  2/11  1.3  7.5 mg  2/12  1.3  7.5 mg    Assessment/ Plan:  Warfarin 7.5 mg x 1 today    Pharmacy will continue to monitor daily and adjust therapy as indicated.     Marquise Hernandez, PharmD  Clinical Pharmacist, Orthopedics and Med/Surg  [facility] ([phone])

## 2024-02-13 VITALS
DIASTOLIC BLOOD PRESSURE: 81 MMHG | HEART RATE: 73 BPM | SYSTOLIC BLOOD PRESSURE: 121 MMHG | RESPIRATION RATE: 16 BRPM | TEMPERATURE: 98.5 F | OXYGEN SATURATION: 96 % | BODY MASS INDEX: 30.88 KG/M2 | WEIGHT: 228 LBS | HEIGHT: 72 IN

## 2024-02-13 LAB
GLUCOSE BLD STRIP.AUTO-MCNC: 118 MG/DL (ref 65–117)
INR PPP: 1.7 (ref 0.9–1.1)
PROTHROMBIN TIME: 17.3 SEC (ref 9–11.1)
SERVICE CMNT-IMP: ABNORMAL

## 2024-02-13 PROCEDURE — 85610 PROTHROMBIN TIME: CPT

## 2024-02-13 PROCEDURE — 97165 OT EVAL LOW COMPLEX 30 MIN: CPT

## 2024-02-13 PROCEDURE — 82962 GLUCOSE BLOOD TEST: CPT

## 2024-02-13 PROCEDURE — 97535 SELF CARE MNGMENT TRAINING: CPT

## 2024-02-13 PROCEDURE — APPNB60 APP NON BILLABLE TIME 46-60 MINS: Performed by: NURSE PRACTITIONER

## 2024-02-13 PROCEDURE — 6370000000 HC RX 637 (ALT 250 FOR IP): Performed by: ORTHOPAEDIC SURGERY

## 2024-02-13 PROCEDURE — 36415 COLL VENOUS BLD VENIPUNCTURE: CPT

## 2024-02-13 RX ORDER — FAMOTIDINE 20 MG/1
20 TABLET, FILM COATED ORAL 2 TIMES DAILY
Qty: 60 TABLET | Refills: 0 | Status: SHIPPED
Start: 2024-02-13 | End: 2024-03-14

## 2024-02-13 RX ORDER — ACETAMINOPHEN 325 MG/1
650 TABLET ORAL EVERY 6 HOURS
Qty: 56 TABLET | Refills: 0 | Status: SHIPPED
Start: 2024-02-13 | End: 2024-02-20

## 2024-02-13 RX ADMIN — WARFARIN SODIUM 7.5 MG: 2.5 TABLET ORAL at 11:04

## 2024-02-13 RX ADMIN — ACETAMINOPHEN 650 MG: 325 TABLET ORAL at 08:32

## 2024-02-13 RX ADMIN — METFORMIN HYDROCHLORIDE 1000 MG: 500 TABLET ORAL at 08:33

## 2024-02-13 ASSESSMENT — PAIN DESCRIPTION - LOCATION: LOCATION: KNEE

## 2024-02-13 ASSESSMENT — PAIN DESCRIPTION - ORIENTATION: ORIENTATION: LEFT

## 2024-02-13 ASSESSMENT — PAIN SCALES - GENERAL: PAINLEVEL_OUTOF10: 5

## 2024-02-13 ASSESSMENT — PAIN DESCRIPTION - DESCRIPTORS: DESCRIPTORS: ACHING

## 2024-02-13 NOTE — CARE COORDINATION
Transition of Care Plan:    Nursing please call report to [phone].     RUR: 7%  Prior Level of Functioning: independent  Disposition: [facility]  If SNF or IPR: Date FOC offered: 2/10  Date FOC received: 2/10  Accepting facility: [facility]  DME needed: facility has DME  Transportation at discharge: stretcher  IM/IMM Medicare/ letter given: 1st 2/10, 2nd 2/13      Transition of Care Plan to SNF/Rehab    Communication to Patient/Family:  Met with patient and family and they are agreeable to the transition plan. The Plan for Transition of Care is related to the following treatment goals: Skilled Nursing Facility     The Patient and/or patient representative was provided with a choice of provider and agrees  with the discharge plan.      Yes [x] No []    A Freedom of choice list was provided with basic dialogue that supports the patient's individualized plan of care/goals and shares the quality data associated with the providers.       Yes [x] No []    SNF/Rehab Transition:  Patient has been accepted to [facility] and meets criteria for admission.   Patient will transported by Hospital to Home and expected to leave at 2:00PM    Communication to SNF/Rehab:  Bedside RN, Lorena, has been notified to update the transition plan to the facility and call report ([phone]).  Discharge information has been updated on the AVS. And communicated to facility via [employer], or CC link.     Discharge instructions to be available in careport.     Nursing Please include all hard scripts for controlled substances, med rec and dc summary, and AVS in packet.     Reviewed and confirmed with facility, [facility] can manage the patient care needs for the following:     Raad with (X) only those applicable:  Medication:  [x]Medications are available at the facility  []IV Antibiotics    []Controlled Substance - hard copies available sent.  []Weekly Labs    Equipment:  []CPAP/BiPAP  []Wound Vacuum  []Shin or  billed for trip, however  is not able to guarantee 100% coverage for trip. PCS form copied and placed on patient's hard chart.    Smita Holman RN/CRM

## 2024-02-13 NOTE — PROGRESS NOTES
Pharmacist Note - Warfarin Dosing  Consult provided for this 73 y.o. male to manage warfarin for VTE Ppx s/p LEFT TOTAL KNEE ARTHROPLASTY     INR Goal: 1.7- 2.2  Therapy Day: 6    Drugs that may increase INR: None  Drugs that may decrease INR: None  Other current anticoagulants/ drugs that may increase bleeding risk: None  Risk factors: Age > 65  Daily INR ordered: YES    Recent Labs     02/11/24  0725 02/12/24  0407 02/13/24  0913   INR 1.3* 1.3* 1.7*      Date               INR                 Dose  2/08  ---  7.5 mg  2/09  1.1  7.5 mg  2/10  1.4  5 mg  2/11  1.3  7.5 mg  2/12  1.3  7.5 mg  2/13  1.7  7.5 mg    Assessment/ Plan:  Warfarin 7.5 mg x 1 today    Pharmacy will continue to monitor daily and adjust therapy as indicated.     Marquise Hernandez, PharmD  Clinical Pharmacist, Orthopedics and Med/Surg  [facility] ([phone])

## 2024-02-13 NOTE — PLAN OF CARE
Problem: Occupational Therapy - Adult  Goal: By Discharge: Performs self-care activities at highest level of function for planned discharge setting.  See evaluation for individualized goals.  Description: FUNCTIONAL STATUS PRIOR TO ADMISSION:  Patient was ambulatory using SPC and independent for ADLs     Receives Help From: Family, ADL Assistance: Independent,  ,  ,  ,  ,  , Homemaking Assistance: Independent, Ambulation Assistance: Independent, Transfer Assistance: Independent, Active : Yes         HOME SUPPORT: Patient lived with wife but didn't require assistance.     Occupational Therapy Goals  Initiated 2/13/2024    1. Patient will perform lower body dressing with Set-up within 7 day(s).  2. Patient will perform upper body ADLS standing for 5 minutes without fatigue or LOB with Stand by Assist within 7 days.  3. Patient will perform all aspects of toileting at Stand by Assist within 7 days.  4. Patient will perform toilet transfers with Stand by Assist using rolling walker within 7 days.  5. Patient will utilize energy conservation techniques during functional activities without cues within 7 day(s).     Outcome: Progressing   OCCUPATIONAL THERAPY EVALUATION    Patient: Chuy Krause (73 y.o. male)  Date: 2/13/2024  Primary Diagnosis: Primary osteoarthritis of left knee [M17.12]  Osteoarthritis of left knee, unspecified osteoarthritis type [M17.12]  Procedure(s) (LRB):  LEFT TOTAL KNEE ARTHROPLASTY (GEN/BLOCK) (Left) 5 Days Post-Op     Precautions: Weight Bearing, Fall Risk   Left Lower Extremity Weight Bearing: Weight Bearing As Tolerated              ASSESSMENT :  The patient is limited by decreased functional mobility, independence in ADLs, ROM, strength, activity tolerance, endurance, safety awareness, cognition, attention/concentration, s/p POD5 L TKR, currently limited by decreased awareness for need for assist, safety, and impaired carryover of previous safety education .    Based on the  impairments listed above below baseline, up to max A for LB ADLs, min A for functional mobilitty in bathroom with RW with min A for toilet transfer with attempt to pull on walker, requires step by step instruction for RW use, safety with transitional movement, with quick decent to chair with unsafe standn to sit.  At this time recommend SNF for rehab.     Functional Outcome Measure:  The patient scored 16/24 on the [facility] outcome measure which is indicative of below baseline, increased likelihood of rehab needs.         PLAN :  Recommendations and Planned Interventions:   self care training, therapeutic activities, functional mobility training, balance training, therapeutic exercise, endurance activities, cognitive retraining, and patient education    Frequency/Duration: OT Plan of Care: 5 times/week    Recommendation for discharge: (in order for the patient to meet his/her long term goals): Therapy up to 5 days/week in Skilled nursing facility    Other factors to consider for discharge: patient's current support system is unable to meet their requirements for physical assistance, poor safety awareness, and high risk for falls    IF patient discharges home will need the following DME: continuing to assess with progress       SUBJECTIVE:   Patient stated, “I just wanted to sit.” re: when asked why patient sat unsafely in chair  OBJECTIVE DATA SUMMARY:     Past Medical History:   Diagnosis Date    Arthritis of low back     Basal cell carcinoma (BCC)     Osteoarthritis of both knees     Spinal stenosis of lumbar region      Past Surgical History:   Procedure Laterality Date    APPENDECTOMY      COLONOSCOPY N/A 2/19/2018    COLONOSCOPY performed by Miguelito Lunsford MD at [facility] ENDOSCOPY    COLONOSCOPY N/A 09/26/2023    COLONOSCOPY performed by Miguelito Lunsford MD at [facility] ENDOSCOPY    KYPHOSIS SURGERY      SKIN BIOPSY      TONSILLECTOMY      TOTAL KNEE ARTHROPLASTY Left 2/8/2024    LEFT TOTAL KNEE ARTHROPLASTY  History of surgery  colon resection clothing off from knees to feet to facilitate fall prevention, pain management, and energy conservation with Maximal Assist.     Home safety: Patient instructed on home modifications and safety (raise height of ADL objects, appropriate height of chair surfaces, recliner safety, change of floor surfaces, clear pathways) to increase independence and fall prevention.  Patient indicated understanding.    Standing: Patient instructed and demonstrated during ADLs to walk up to sink/counter top/surfaces, step into walker to increase safety of joint and fall prevention with Minimal Assist. Patient educated about knee anatomy verbally and educated to avoid rotation of left LE.  Instructed to apply concept to ADLs within the home (no twisting of knee during reaching across body, square off while using objects, slide objects along surfaces).  Patient instructed to increase amount of time standing, observe standing position during ADLs in order to increase even weight bearing through bilateral LEs in order to increase independence with ADLs.  Goal to be reached 30 days post - op, per orthopedic surgeon or per PT.  Patient indicated understanding.             Transfer Training  Interventions: Safety awareness training;Verbal cues;Weight shifting training/pressure relief  Sit to Stand: Minimum assistance  Stand to Sit: Minimum assistance    [facility] AM-PACTM \"6 Clicks\"                                                       Daily Activity Inpatient Short Form  AM-PAC Daily Activity - Inpatient   How much help is needed for putting on and taking off regular lower body clothing?: A Lot  How much help is needed for bathing (which includes washing, rinsing, drying)?: A Lot  How much help is needed for toileting (which includes using toilet, bedpan, or urinal)?: A Lot  How much help is needed for putting on and taking off regular upper body clothing?: None  How much help is needed for taking care of personal grooming?:

## 2024-02-13 NOTE — PROGRESS NOTES
Ortho NP Note    POD# 5  s/p LEFT TOTAL KNEE ARTHROPLASTY (GEN/BLOCK)   Pt seen with no visitor.     Pt sitting up in chair, eating lunch  AO x 3  C/o postop knee \"soreness not pain\" well controlled with tylenol alone  Avoiding narcotics due to confusion and sedation with oxycodone   Last dose of oxycodone was 2/10 at 6:30 am  Reports LBM today.     VSS Afebrile.    Visit Vitals  /81   Pulse 73   Temp 98.5 °F (36.9 °C) (Oral)   Resp 16   Ht 1.829 m (6')   Wt 103.4 kg (228 lb)   SpO2 96%   BMI 30.92 kg/m²       Voiding status: voiding          Labs    Lab Results   Component Value Date/Time    HGB 12.5 02/09/2024 10:57 AM      Lab Results   Component Value Date/Time    INR 1.7 02/13/2024 09:13 AM      Lab Results   Component Value Date/Time     02/09/2024 10:57 AM    K 3.8 02/09/2024 10:57 AM     02/09/2024 10:57 AM    CO2 28 02/09/2024 10:57 AM    BUN 14 02/09/2024 10:57 AM     Recent Glucose Results:   Glucose   Date Value Ref Range Status   02/09/2024 117 (H) 65 - 100 mg/dL Final           Body mass index is 30.92 kg/m². : A BMI > 30 is classified as obesity and > 40 is classified as morbid obesity.       Dressing c.d.i  Cryotherapy in place over incision  Calves soft and supple; No pain with passive stretch  Sensation and motor intact. +PF/DF/EHL intact   SCDs for mechanical DVT proph while in bed     PLAN:  1) PT BID - WBAT  2) Coumadin for DVT Prophylaxis. INR 1.7. Received Coumadin 7.5 mg today.    Encouraged early mobilization, bed exercises, and SCD use.  3) GI Prophylaxis - Pepcid  4) Pain control - scheduled tylenol, avoid narcotics  .  5) Post op care - Continue bowel regimen, encouraged IS.  Aquacel to remain in place x 7 days unless integrity is lost.   6) Discharge planning - SNF placement. [facility] able to accept today.       COLETTE Galicia - NP  Available via [employer]

## 2024-02-14 NOTE — PROGRESS NOTES
Physician Progress Note      PATIENT:               CAMERON AVILA  [facility] #:                  [id number]  :                       1950  ADMIT DATE:       2024 9:00 AM  DISCH DATE:        2024 1:56 PM  RESPONDING  PROVIDER #:        Roxy Perez NP          QUERY TEXT:    Patient admitted to observation status on 2024 for TKA.  Noted inpatient   admission order on Feb 10, 2024. If possible, please document in progress   notes and discharge summary the reason for inpatient admission.    The medical record reflects the following:  Risk Factors: s/p TKA    Clinical Indicators:   PN:  pain, has not had any IV pain meds, trouble sleeping, nerve block   wore off prior to bedtime last night. Continue physical therapy, Ice and   elevate     2:05 PN: Received call from nurse with concerns for patient's drowsiness   and confusion. Unable to stay awake in conversation, able to sit on EOB with   PT however further mobility limited due to drowsiness and falling asleep. Wife   denies hx of confusion PTA. VSS. Receiving oxycodone 10 mg for postop knee   pain. Reports little sleep overnight. Dr. Hu would like to continue   oxycodone 10 mg for now and reports hx dementia.    2/10 PN: comfortable in bed, had only Tylenol for pain overnight. A&Ox3 this   am. Oxycodone PRN meds adjusted to a scale for mild, mod, severe pending   patients pain level to help with mentation as well as control pain.    PN: trying to avoid oxycodone as able. Continue with pain regimen as   needed. Slow to progress with PT     PN: pain controlled, less confused today. PT has been difficult at times.   Low dose narcotics as needed 2 delirium  Continue physical therapy     PN: AO x 3, C/o postop knee \"soreness not pain\" well controlled with   tylenol alone  Avoiding narcotics due to confusion and sedation with oxycodone  Pain control - scheduled tylenol, avoid narcotics    Treatment: Roxicodone, Tylenol, ice

## 2024-02-23 NOTE — DISCHARGE SUMMARY
Ortho Discharge Summary    Patient ID:  Chuy Krause  [id number]  male  73 y.o.  1950    Admit date: 2/8/2024    Discharge date: 2/23/2024    Admitting Physician: Ashley Hu MD     Consulting Physician(s):   Treatment Team: Surgeon: Ashley Hu MD; Utilization Reviewer: Sharad Batista RN; : Jossie Cheatham; Registered Nurse: Lorena Hassan RN; : Smita Holman    Date of Surgery:   2/8/2024     Preoperative Diagnosis:  Primary osteoarthritis of left knee [M17.12]    Postoperative Diagnosis:   * No post-op diagnosis entered *    Procedure(s):   LEFT TOTAL KNEE ARTHROPLASTY (GEN/BLOCK)     Anesthesia Type:   Spinal     Surgeon: Ashley Hu MD                            HPI:  Pt is a 73 y.o. male who has a history of Primary osteoarthritis of left knee [M17.12]  with pain and limitations of activities of daily living who presents at this time for a LEFT TOTAL KNEE ARTHROPLASTY (GEN/BLOCK) following the failure of conservative management.    PMH:   Past Medical History:   Diagnosis Date    Arthritis of low back     Basal cell carcinoma (BCC)     Osteoarthritis of both knees     Spinal stenosis of lumbar region        Body mass index is 30.92 kg/m². : A BMI > 30 is classified as obesity and > 40 is classified as morbid obesity.     Medications upon admission :   Prior to Admission Medications   Prescriptions Last Dose Informant Patient Reported? Taking?   GARLIC PO Past Week  Yes No   Sig: Take 1 tablet by mouth every morning   Omega-3 Fatty Acids (FISH OIL) 1000 MG capsule Past Week  Yes No   Sig: Take by mouth 2 times daily   Red Yeast Rice Extract (RED YEAST RICE PO) Past Week  Yes No   Sig: Take 1,000 mg by mouth daily   b complex vitamins capsule Past Week  Yes No   Sig: Take 1 capsule by mouth every morning   diclofenac sodium (VOLTAREN) 1 % GEL 2/7/2024  Yes No   Sig: Apply 2 g topically every morning   ibuprofen (ADVIL;MOTRIN) 200 MG tablet Past Week  Yes No    Sig: Take 2 tablets by mouth as needed for Pain   metFORMIN (GLUCOPHAGE) 500 MG tablet   Yes No   Sig: Take 2 tablets by mouth daily (with breakfast)   naproxen (NAPROSYN) 250 MG tablet Past Week  Yes No   Sig: Take 2 tablets by mouth every evening   turmeric 500 MG CAPS Past Week  Yes No   Sig: Take 2 capsules by mouth Daily with supper   vitamin D (CHOLECALCIFEROL) 125 MCG (5000 UT) CAPS capsule Past Week  Yes No   Sig: Take 1 capsule by mouth every morning      Facility-Administered Medications Last Administration Doses Remaining   methylPREDNISolone acetate (DEPO-MEDROL) injection 80 mg None recorded 1           Allergies:  No Known Allergies     Hospital Course:  The patient underwent surgery.  Complications:  None; patient tolerated the procedure well. Was taken to the PACU in stable condition and then transferred to the ortho floor.  Patient experienced confusion and sedation with use of oxycodone. Narcotics were held and mentation improved to baseline. He made slow progress with therapy, recommendations for SNF at discharge. Patient discharged to [facility].     Perioperative Antibiotics:  Ancef     Postoperative Pain Management:  Tylenol     DVT Prophylaxis:    Coumadin      Postoperative transfusions:    Number of units banked PRBCs =   none     Post Op complications: none    Hemoglobin at discharge:    Lab Results   Component Value Date/Time    HGB 12.5 02/09/2024 10:57 AM    INR 1.7 (H) 02/13/2024 09:13 AM       Dressing remained clean, dry and intact. No significant erythema or swelling. Wound appears to be healing without any evidence of infection. Neurovascular exam found to be within normal limits.     Physical Therapy started following surgery and participated in bed mobility, transfers and ambulation.          Discharged to: SNF - [facility].    Condition on Discharge:   Stable    Discharge instructions:  - Anticoagulate with Coumadin    - Take pain medications as prescribed  - Resume pre

## 2024-02-28 NOTE — PROGRESS NOTES
Physician Progress Note      PATIENT:               CAMERON AVILA  CSN #:                  [id number]  :                       1950  ADMIT DATE:       2024 9:00 AM  DISCH DATE:        2024 1:56 PM  RESPONDING  PROVIDER #:        Roxy Perez NP          QUERY TEXT:    Patient presented as an outpatient for a total knee replacement. 2/10 progress   notes documents: Begin discharge planning, possible DC home today pending PT   clearance and pain control. Patient was receiving Tylenol trying to avoid   narcotics. Patient was able to continue with PT with some difficulty.  For clarification purposes was the condition that necessitated admission to   inpatient:    The medical record reflects the following:  Risk Factors: s/p TKA, became confused with narcotics making participation   with PT difficult    Clinical Indicators:   PN:  pain, has not had any IV pain meds, trouble sleeping, nerve block   wore off prior to bedtime last night. Continue physical therapy, Ice and   elevate     2:05 PN: Received call from nurse with concerns for patient's drowsiness   and confusion. Unable to stay awake in conversation, able to sit on EOB with   PT however further mobility limited due to drowsiness and falling asleep.    2/10 PN: comfortable in bed, had only Tylenol for pain overnight. A&Ox3 this   am. Oxycodone PRN meds adjusted to a scale for mild, mod, severe pending   patients pain level to help with mentation as well as control pain. Begin   discharge planning, possible DC home today pending PT clearance and pain   control    PN: Continue with pain regimen as needed. Slow to progress with PT.     PN: pain controlled, less confused today. PT has been difficult at times.   Low dose narcotics as needed 2 delirium  Continue physical therapy     PN: AO x 3, C/o postop knee \"soreness not pain\" well controlled with   tylenol alone. Avoiding narcotics due to confusion and sedation with

## 2024-06-25 PROBLEM — M17.11 OSTEOARTHRITIS OF RIGHT KNEE: Status: ACTIVE | Noted: 2024-06-25

## 2024-11-08 ENCOUNTER — HOSPITAL ENCOUNTER (OUTPATIENT)
Facility: HOSPITAL | Age: 74
Discharge: HOME | End: 2024-11-11
Payer: MEDICARE

## 2024-11-08 VITALS
TEMPERATURE: 97.9 F | BODY MASS INDEX: 31.33 KG/M2 | SYSTOLIC BLOOD PRESSURE: 138 MMHG | WEIGHT: 236.4 LBS | DIASTOLIC BLOOD PRESSURE: 84 MMHG | HEIGHT: 73 IN | HEART RATE: 69 BPM

## 2024-11-08 LAB
ABO + RH BLD: NORMAL
BLOOD GROUP ANTIBODIES SERPL: NORMAL
SPECIMEN EXP DATE BLD: NORMAL

## 2024-11-08 PROCEDURE — 86901 BLOOD TYPING SEROLOGIC RH(D): CPT

## 2024-11-08 PROCEDURE — 36415 COLL VENOUS BLD VENIPUNCTURE: CPT

## 2024-11-08 PROCEDURE — 86850 RBC ANTIBODY SCREEN: CPT

## 2024-11-08 PROCEDURE — 86900 BLOOD TYPING SEROLOGIC ABO: CPT

## 2024-11-08 ASSESSMENT — KOOS JR
KOOS JR TOTAL INTERVAL SCORE: 44.905
STANDING UPRIGHT: MODERATE
HOW SEVERE IS YOUR KNEE STIFFNESS AFTER FIRST WAKING IN MORNING: SEVERE
TWISING OR PIVOTING ON KNEE: EXTREME
BENDING TO THE FLOOR TO PICK UP OBJECT: MODERATE
STRAIGHTENING KNEE FULLY: MILD
RISING FROM SITTING: MODERATE
GOING UP OR DOWN STAIRS: SEVERE

## 2024-11-08 ASSESSMENT — PROMIS GLOBAL HEALTH SCALE
IN GENERAL, HOW WOULD YOU RATE YOUR PHYSICAL HEALTH [ON A SCALE OF 1 (POOR) TO 5 (EXCELLENT)]?: VERY GOOD
IN GENERAL, WOULD YOU SAY YOUR HEALTH IS...[ON A SCALE OF 1 (POOR) TO 5 (EXCELLENT)]: VERY GOOD
SUM OF RESPONSES TO QUESTIONS 3, 6, 7, & 8: 18
IN THE PAST 7 DAYS, HOW OFTEN HAVE YOU BEEN BOTHERED BY EMOTIONAL PROBLEMS, SUCH AS FEELING ANXIOUS, DEPRESSED, OR IRRITABLE [ON A SCALE FROM 1 (NEVER) TO 5 (ALWAYS)]?: RARELY
IN GENERAL, HOW WOULD YOU RATE YOUR MENTAL HEALTH, INCLUDING YOUR MOOD AND YOUR ABILITY TO THINK [ON A SCALE OF 1 (POOR) TO 5 (EXCELLENT)]?: EXCELLENT
IN THE PAST 7 DAYS, HOW WOULD YOU RATE YOUR PAIN ON AVERAGE [ON A SCALE FROM 0 (NO PAIN) TO 10 (WORST IMAGINABLE PAIN)]?: 7
IN GENERAL, PLEASE RATE HOW WELL YOU CARRY OUT YOUR USUAL SOCIAL ACTIVITIES (INCLUDES ACTIVITIES AT HOME, AT WORK, AND IN YOUR COMMUNITY, AND RESPONSIBILITIES AS A PARENT, CHILD, SPOUSE, EMPLOYEE, FRIEND, ETC) [ON A SCALE OF 1 (POOR) TO 5 (EXCELLENT)]?: FAIR
WHO IS THE PERSON COMPLETING THE PROMIS V1.1 SURVEY?: SELF
IN GENERAL, HOW WOULD YOU RATE YOUR SATISFACTION WITH YOUR SOCIAL ACTIVITIES AND RELATIONSHIPS [ON A SCALE OF 1 (POOR) TO 5 (EXCELLENT)]?: VERY GOOD
TO WHAT EXTENT ARE YOU ABLE TO CARRY OUT YOUR EVERYDAY PHYSICAL ACTIVITIES SUCH AS WALKING, CLIMBING STAIRS, CARRYING GROCERIES, OR MOVING A CHAIR [ON A SCALE OF 1 (NOT AT ALL) TO 5 (COMPLETELY)]?: A LITTLE
HOW IS THE PROMIS V1.1 BEING ADMINISTERED?: PAPER
IN GENERAL, WOULD YOU SAY YOUR QUALITY OF LIFE IS...[ON A SCALE OF 1 (POOR) TO 5 (EXCELLENT)]: VERY GOOD
SUM OF RESPONSES TO QUESTIONS 2, 4, 5, & 10: 17

## 2024-11-08 ASSESSMENT — PAIN SCALES - GENERAL: PAINLEVEL_OUTOF10: 1

## 2024-11-08 ASSESSMENT — PAIN DESCRIPTION - ORIENTATION: ORIENTATION: RIGHT

## 2024-11-08 ASSESSMENT — PAIN DESCRIPTION - PAIN TYPE: TYPE: CHRONIC PAIN

## 2024-11-08 ASSESSMENT — PAIN - FUNCTIONAL ASSESSMENT: PAIN_FUNCTIONAL_ASSESSMENT: PREVENTS OR INTERFERES SOME ACTIVE ACTIVITIES AND ADLS

## 2024-11-08 ASSESSMENT — PAIN DESCRIPTION - ONSET: ONSET: GRADUAL

## 2024-11-08 ASSESSMENT — PAIN DESCRIPTION - DESCRIPTORS: DESCRIPTORS: ACHING;DISCOMFORT;SORE

## 2024-11-08 ASSESSMENT — PAIN DESCRIPTION - LOCATION: LOCATION: KNEE

## 2024-11-08 ASSESSMENT — PAIN DESCRIPTION - FREQUENCY: FREQUENCY: INTERMITTENT

## 2024-11-08 NOTE — PERIOP NOTE
[address] [address]   MAIN OR                     [phone]    MAIN PRE OP             [phone]                                                                                AMBULATORY PRE OP          [phone]  PRE-ADMISSION TESTING    [phone]     Surgery Date:  11/21/24       Is surgery arrival time given by surgeon? NO    If “NO”, [place] staff will call you between 4 and 7pm the day before your surgery with your arrival time. (If your surgery is on a Monday, we will call you the Friday before.)    Call [phone] after 7pm Monday-Friday if you did not receive this call.    INSTRUCTIONS BEFORE YOUR SURGERY   When You  Arrive Arrive at [facility] Patient Access on 1st floor the day of your surgery.  Have your insurance card, photo ID,living will/advanced directive/POA (if applicable),  and any copayment (if needed)   Food   and   Drink NO solid food after midnight the night before surgery. You can drink clear liquids from midnight until ONE hour prior to your arrival at the hospital on the day of your surgery. Clear liquids include:  Water  Fruit juices without pulp  Carbonated beverages  Black coffee(no cream/milk)  Tea(no cream/milk)  Gatorade    No alcohol (beer, wine, liquor) or marijuana (smoking) 24 hours, edibles (3 days). Stop smoking cigarettes 14 days before surgery (helps w/healing and breathing).   Medications to   TAKE   Morning of Surgery MEDICATIONS TO TAKE THE MORNING OF SURGERY WITH A SIP OF WATER:   NONE    You may take these medications, IF NEEDED, the morning of surgery: NONE    Ask your surgeon/prescribing doctor for instructions on taking or stopping these medications prior to surgery: NONE     Medications to STOP  before surgery Non-Steroidal anti-inflammatory Drugs (NSAID's): for example, Ibuprofen (Advil, Motrin), Naproxen (Aleve) 3 days    STOP all herbal supplements and vitamins(unless prescribed

## 2024-11-08 NOTE — PERIOP NOTE
CALLED TO PCP/DR. YESSENIA WIGGINS/[phone] AND REQ LABS AND EKG DONE IN THEIR OFFICE \"IN THE PAST FEW WEEKS\" PER PT.  SPOKE TO JADEN IN THEIR OFFICE.    REC'D CBC, CMP, PT, A1C, UWCI AND EKG FROM PCP AND PLACED ON CHART FOR REVIEW.

## 2024-11-09 LAB
BACTERIA SPEC CULT: NORMAL
BACTERIA SPEC CULT: NORMAL
SERVICE CMNT-IMP: NORMAL

## 2024-11-21 ENCOUNTER — HOSPITAL ENCOUNTER (INPATIENT)
Facility: HOSPITAL | Age: 74
LOS: 4 days | Discharge: NURSING, REHAB OR LONG-TERM CARE | DRG: 470 | End: 2024-11-26
Attending: ORTHOPAEDIC SURGERY | Admitting: ORTHOPAEDIC SURGERY
Payer: MEDICARE

## 2024-11-21 ENCOUNTER — ANESTHESIA EVENT (OUTPATIENT)
Facility: HOSPITAL | Age: 74
End: 2024-11-21
Payer: MEDICARE

## 2024-11-21 ENCOUNTER — ANESTHESIA (OUTPATIENT)
Facility: HOSPITAL | Age: 74
End: 2024-11-21
Payer: MEDICARE

## 2024-11-21 DIAGNOSIS — Z96.651 HISTORY OF TOTAL KNEE ARTHROPLASTY, RIGHT: ICD-10-CM

## 2024-11-21 DIAGNOSIS — M17.11 PRIMARY OSTEOARTHRITIS OF RIGHT KNEE: Primary | ICD-10-CM

## 2024-11-21 LAB
GLUCOSE BLD STRIP.AUTO-MCNC: 98 MG/DL (ref 65–117)
SERVICE CMNT-IMP: NORMAL

## 2024-11-21 PROCEDURE — 6360000002 HC RX W HCPCS: Performed by: STUDENT IN AN ORGANIZED HEALTH CARE EDUCATION/TRAINING PROGRAM

## 2024-11-21 PROCEDURE — G0378 HOSPITAL OBSERVATION PER HR: HCPCS

## 2024-11-21 PROCEDURE — 6360000002 HC RX W HCPCS: Performed by: ORTHOPAEDIC SURGERY

## 2024-11-21 PROCEDURE — 6370000000 HC RX 637 (ALT 250 FOR IP): Performed by: STUDENT IN AN ORGANIZED HEALTH CARE EDUCATION/TRAINING PROGRAM

## 2024-11-21 PROCEDURE — 7100000000 HC PACU RECOVERY - FIRST 15 MIN: Performed by: ORTHOPAEDIC SURGERY

## 2024-11-21 PROCEDURE — C9290 INJ, BUPIVACAINE LIPOSOME: HCPCS | Performed by: ORTHOPAEDIC SURGERY

## 2024-11-21 PROCEDURE — 3700000000 HC ANESTHESIA ATTENDED CARE: Performed by: ORTHOPAEDIC SURGERY

## 2024-11-21 PROCEDURE — 2500000003 HC RX 250 WO HCPCS: Performed by: NURSE ANESTHETIST, CERTIFIED REGISTERED

## 2024-11-21 PROCEDURE — 27447 TOTAL KNEE ARTHROPLASTY: CPT | Performed by: ORTHOPAEDIC SURGERY

## 2024-11-21 PROCEDURE — 2580000003 HC RX 258: Performed by: STUDENT IN AN ORGANIZED HEALTH CARE EDUCATION/TRAINING PROGRAM

## 2024-11-21 PROCEDURE — 64447 NJX AA&/STRD FEMORAL NRV IMG: CPT | Performed by: STUDENT IN AN ORGANIZED HEALTH CARE EDUCATION/TRAINING PROGRAM

## 2024-11-21 PROCEDURE — 82962 GLUCOSE BLOOD TEST: CPT

## 2024-11-21 PROCEDURE — 3600000015 HC SURGERY LEVEL 5 ADDTL 15MIN: Performed by: ORTHOPAEDIC SURGERY

## 2024-11-21 PROCEDURE — 0SRC0J9 REPLACEMENT OF RIGHT KNEE JOINT WITH SYNTHETIC SUBSTITUTE, CEMENTED, OPEN APPROACH: ICD-10-PCS | Performed by: ORTHOPAEDIC SURGERY

## 2024-11-21 PROCEDURE — 3600000005 HC SURGERY LEVEL 5 BASE: Performed by: ORTHOPAEDIC SURGERY

## 2024-11-21 PROCEDURE — 20985 CPTR-ASST DIR MS PX: CPT | Performed by: ORTHOPAEDIC SURGERY

## 2024-11-21 PROCEDURE — 6360000002 HC RX W HCPCS: Performed by: NURSE ANESTHETIST, CERTIFIED REGISTERED

## 2024-11-21 PROCEDURE — C1776 JOINT DEVICE (IMPLANTABLE): HCPCS | Performed by: ORTHOPAEDIC SURGERY

## 2024-11-21 PROCEDURE — 2709999900 HC NON-CHARGEABLE SUPPLY: Performed by: ORTHOPAEDIC SURGERY

## 2024-11-21 PROCEDURE — 7100000001 HC PACU RECOVERY - ADDTL 15 MIN: Performed by: ORTHOPAEDIC SURGERY

## 2024-11-21 PROCEDURE — 2580000003 HC RX 258: Performed by: NURSE ANESTHETIST, CERTIFIED REGISTERED

## 2024-11-21 PROCEDURE — C1713 ANCHOR/SCREW BN/BN,TIS/BN: HCPCS | Performed by: ORTHOPAEDIC SURGERY

## 2024-11-21 PROCEDURE — 2500000003 HC RX 250 WO HCPCS: Performed by: ORTHOPAEDIC SURGERY

## 2024-11-21 PROCEDURE — 2580000003 HC RX 258: Performed by: ORTHOPAEDIC SURGERY

## 2024-11-21 PROCEDURE — 3700000001 HC ADD 15 MINUTES (ANESTHESIA): Performed by: ORTHOPAEDIC SURGERY

## 2024-11-21 PROCEDURE — 27447 TOTAL KNEE ARTHROPLASTY: CPT | Performed by: PHYSICIAN ASSISTANT

## 2024-11-21 DEVICE — COMPONENT TOT KNEE CAPPED K1 STD HEMI CEM: Type: IMPLANTABLE DEVICE | Status: FUNCTIONAL

## 2024-11-21 DEVICE — CRC TIBIAL INSERT
Type: IMPLANTABLE DEVICE | Site: KNEE | Status: FUNCTIONAL
Brand: TRULIANT, ACTIVIT-E

## 2024-11-21 DEVICE — IMPLANTABLE DEVICE
Type: IMPLANTABLE DEVICE | Site: KNEE | Status: FUNCTIONAL
Brand: TRULIANT

## 2024-11-21 DEVICE — IMPLANTABLE DEVICE
Type: IMPLANTABLE DEVICE | Site: KNEE | Status: FUNCTIONAL
Brand: OPTETRAK LOGIC

## 2024-11-21 DEVICE — CEMENT BNE MED VISC 80 GM GENTAMICIN PALACOS MV+G PRO: Type: IMPLANTABLE DEVICE | Site: KNEE | Status: FUNCTIONAL

## 2024-11-21 DEVICE — PATELLA
Type: IMPLANTABLE DEVICE | Site: KNEE | Status: FUNCTIONAL
Brand: TRULIANT, ACTIVIT-E

## 2024-11-21 RX ORDER — FENTANYL CITRATE 50 UG/ML
100 INJECTION, SOLUTION INTRAMUSCULAR; INTRAVENOUS
Status: DISCONTINUED | OUTPATIENT
Start: 2024-11-21 | End: 2024-11-21 | Stop reason: HOSPADM

## 2024-11-21 RX ORDER — ONDANSETRON 4 MG/1
4 TABLET, ORALLY DISINTEGRATING ORAL EVERY 8 HOURS PRN
Status: CANCELLED | OUTPATIENT
Start: 2024-11-21

## 2024-11-21 RX ORDER — WARFARIN SODIUM 2.5 MG/1
TABLET ORAL
Qty: 60 TABLET | Refills: 3 | Status: SHIPPED | OUTPATIENT
Start: 2024-11-21

## 2024-11-21 RX ORDER — ACETAMINOPHEN 500 MG
1000 TABLET ORAL ONCE
Status: DISCONTINUED | OUTPATIENT
Start: 2024-11-21 | End: 2024-11-21 | Stop reason: HOSPADM

## 2024-11-21 RX ORDER — ONDANSETRON 2 MG/ML
4 INJECTION INTRAMUSCULAR; INTRAVENOUS EVERY 6 HOURS PRN
Status: CANCELLED | OUTPATIENT
Start: 2024-11-21

## 2024-11-21 RX ORDER — ONDANSETRON 2 MG/ML
4 INJECTION INTRAMUSCULAR; INTRAVENOUS
Status: DISCONTINUED | OUTPATIENT
Start: 2024-11-21 | End: 2024-11-21 | Stop reason: HOSPADM

## 2024-11-21 RX ORDER — FENTANYL CITRATE 50 UG/ML
25 INJECTION, SOLUTION INTRAMUSCULAR; INTRAVENOUS EVERY 5 MIN PRN
Status: DISCONTINUED | OUTPATIENT
Start: 2024-11-21 | End: 2024-11-21 | Stop reason: HOSPADM

## 2024-11-21 RX ORDER — HYDROXYZINE HYDROCHLORIDE 10 MG/1
10 TABLET, FILM COATED ORAL EVERY 8 HOURS PRN
Status: CANCELLED | OUTPATIENT
Start: 2024-11-21

## 2024-11-21 RX ORDER — SENNA AND DOCUSATE SODIUM 50; 8.6 MG/1; MG/1
1 TABLET, FILM COATED ORAL 2 TIMES DAILY
Status: DISCONTINUED | OUTPATIENT
Start: 2024-11-21 | End: 2024-11-26 | Stop reason: HOSPADM

## 2024-11-21 RX ORDER — LIDOCAINE HYDROCHLORIDE 10 MG/ML
1 INJECTION, SOLUTION EPIDURAL; INFILTRATION; INTRACAUDAL; PERINEURAL
Status: DISCONTINUED | OUTPATIENT
Start: 2024-11-21 | End: 2024-11-21 | Stop reason: HOSPADM

## 2024-11-21 RX ORDER — FAMOTIDINE 20 MG/1
20 TABLET, FILM COATED ORAL 2 TIMES DAILY
Status: CANCELLED | OUTPATIENT
Start: 2024-11-21

## 2024-11-21 RX ORDER — SODIUM CHLORIDE 0.9 % (FLUSH) 0.9 %
5-40 SYRINGE (ML) INJECTION EVERY 12 HOURS SCHEDULED
Status: DISCONTINUED | OUTPATIENT
Start: 2024-11-21 | End: 2024-11-21 | Stop reason: HOSPADM

## 2024-11-21 RX ORDER — NALOXONE HYDROCHLORIDE 0.4 MG/ML
INJECTION, SOLUTION INTRAMUSCULAR; INTRAVENOUS; SUBCUTANEOUS PRN
Status: DISCONTINUED | OUTPATIENT
Start: 2024-11-21 | End: 2024-11-21 | Stop reason: HOSPADM

## 2024-11-21 RX ORDER — PROPOFOL 10 MG/ML
INJECTION, EMULSION INTRAVENOUS
Status: DISCONTINUED | OUTPATIENT
Start: 2024-11-21 | End: 2024-11-21 | Stop reason: SDUPTHER

## 2024-11-21 RX ORDER — DEXAMETHASONE SODIUM PHOSPHATE 4 MG/ML
INJECTION, SOLUTION INTRA-ARTICULAR; INTRALESIONAL; INTRAMUSCULAR; INTRAVENOUS; SOFT TISSUE
Status: DISCONTINUED | OUTPATIENT
Start: 2024-11-21 | End: 2024-11-21 | Stop reason: SDUPTHER

## 2024-11-21 RX ORDER — CEFAZOLIN SODIUM 1 G/3ML
INJECTION, POWDER, FOR SOLUTION INTRAMUSCULAR; INTRAVENOUS
Status: DISCONTINUED | OUTPATIENT
Start: 2024-11-21 | End: 2024-11-21 | Stop reason: SDUPTHER

## 2024-11-21 RX ORDER — 0.9 % SODIUM CHLORIDE 0.9 %
500 INTRAVENOUS SOLUTION INTRAVENOUS PRN
Status: DISCONTINUED | OUTPATIENT
Start: 2024-11-21 | End: 2024-11-26 | Stop reason: HOSPADM

## 2024-11-21 RX ORDER — PROCHLORPERAZINE EDISYLATE 5 MG/ML
5 INJECTION INTRAMUSCULAR; INTRAVENOUS
Status: DISCONTINUED | OUTPATIENT
Start: 2024-11-21 | End: 2024-11-21 | Stop reason: HOSPADM

## 2024-11-21 RX ORDER — SENNOSIDES A AND B 8.6 MG/1
1 TABLET, FILM COATED ORAL DAILY PRN
Status: CANCELLED | OUTPATIENT
Start: 2024-11-21

## 2024-11-21 RX ORDER — TRANEXAMIC ACID 100 MG/ML
INJECTION, SOLUTION INTRAVENOUS
Status: DISCONTINUED | OUTPATIENT
Start: 2024-11-21 | End: 2024-11-21 | Stop reason: SDUPTHER

## 2024-11-21 RX ORDER — ACETAMINOPHEN 325 MG/1
650 TABLET ORAL EVERY 6 HOURS
Status: CANCELLED | OUTPATIENT
Start: 2024-11-21

## 2024-11-21 RX ORDER — GINSENG 100 MG
CAPSULE ORAL 3 TIMES DAILY
Status: DISCONTINUED | OUTPATIENT
Start: 2024-11-21 | End: 2024-11-26 | Stop reason: HOSPADM

## 2024-11-21 RX ORDER — ACETAMINOPHEN 325 MG/1
650 TABLET ORAL EVERY 6 HOURS
Status: DISCONTINUED | OUTPATIENT
Start: 2024-11-21 | End: 2024-11-26 | Stop reason: HOSPADM

## 2024-11-21 RX ORDER — SODIUM CHLORIDE 0.9 % (FLUSH) 0.9 %
5-40 SYRINGE (ML) INJECTION PRN
Status: DISCONTINUED | OUTPATIENT
Start: 2024-11-21 | End: 2024-11-21 | Stop reason: HOSPADM

## 2024-11-21 RX ORDER — BISACODYL 10 MG
10 SUPPOSITORY, RECTAL RECTAL DAILY PRN
Status: DISCONTINUED | OUTPATIENT
Start: 2024-11-21 | End: 2024-11-26 | Stop reason: HOSPADM

## 2024-11-21 RX ORDER — DEXAMETHASONE SODIUM PHOSPHATE 10 MG/ML
8 INJECTION, SOLUTION INTRAMUSCULAR; INTRAVENOUS ONCE
Status: DISCONTINUED | OUTPATIENT
Start: 2024-11-21 | End: 2024-11-21 | Stop reason: HOSPADM

## 2024-11-21 RX ORDER — ONDANSETRON 4 MG/1
4 TABLET, ORALLY DISINTEGRATING ORAL EVERY 8 HOURS PRN
Status: DISCONTINUED | OUTPATIENT
Start: 2024-11-21 | End: 2024-11-26 | Stop reason: HOSPADM

## 2024-11-21 RX ORDER — SODIUM CHLORIDE 0.9 % (FLUSH) 0.9 %
5-40 SYRINGE (ML) INJECTION PRN
Status: DISCONTINUED | OUTPATIENT
Start: 2024-11-21 | End: 2024-11-26 | Stop reason: HOSPADM

## 2024-11-21 RX ORDER — OXYCODONE HYDROCHLORIDE 5 MG/1
5 TABLET ORAL
Status: DISCONTINUED | OUTPATIENT
Start: 2024-11-21 | End: 2024-11-21 | Stop reason: HOSPADM

## 2024-11-21 RX ORDER — SODIUM CHLORIDE 0.9 % (FLUSH) 0.9 %
5-40 SYRINGE (ML) INJECTION PRN
Status: CANCELLED | OUTPATIENT
Start: 2024-11-21

## 2024-11-21 RX ORDER — SODIUM CHLORIDE 0.9 % (FLUSH) 0.9 %
5-40 SYRINGE (ML) INJECTION EVERY 12 HOURS SCHEDULED
Status: DISCONTINUED | OUTPATIENT
Start: 2024-11-21 | End: 2024-11-26 | Stop reason: HOSPADM

## 2024-11-21 RX ORDER — LIDOCAINE HYDROCHLORIDE 20 MG/ML
INJECTION, SOLUTION EPIDURAL; INFILTRATION; INTRACAUDAL; PERINEURAL
Status: DISCONTINUED | OUTPATIENT
Start: 2024-11-21 | End: 2024-11-21 | Stop reason: SDUPTHER

## 2024-11-21 RX ORDER — SODIUM CHLORIDE 0.9 % (FLUSH) 0.9 %
5-40 SYRINGE (ML) INJECTION EVERY 12 HOURS SCHEDULED
Status: CANCELLED | OUTPATIENT
Start: 2024-11-21

## 2024-11-21 RX ORDER — ACETAMINOPHEN 500 MG
1000 TABLET ORAL ONCE
Status: DISCONTINUED | OUTPATIENT
Start: 2024-11-21 | End: 2024-11-21 | Stop reason: SDUPTHER

## 2024-11-21 RX ORDER — FENTANYL CITRATE 50 UG/ML
INJECTION, SOLUTION INTRAMUSCULAR; INTRAVENOUS
Status: DISCONTINUED | OUTPATIENT
Start: 2024-11-21 | End: 2024-11-21 | Stop reason: SDUPTHER

## 2024-11-21 RX ORDER — KETOROLAC TROMETHAMINE 30 MG/ML
15 INJECTION, SOLUTION INTRAMUSCULAR; INTRAVENOUS EVERY 6 HOURS PRN
Status: DISCONTINUED | OUTPATIENT
Start: 2024-11-21 | End: 2024-11-22

## 2024-11-21 RX ORDER — MIDAZOLAM HYDROCHLORIDE 2 MG/2ML
2 INJECTION, SOLUTION INTRAMUSCULAR; INTRAVENOUS PRN
Status: DISCONTINUED | OUTPATIENT
Start: 2024-11-21 | End: 2024-11-21 | Stop reason: HOSPADM

## 2024-11-21 RX ORDER — SODIUM CHLORIDE, SODIUM LACTATE, POTASSIUM CHLORIDE, CALCIUM CHLORIDE 600; 310; 30; 20 MG/100ML; MG/100ML; MG/100ML; MG/100ML
INJECTION, SOLUTION INTRAVENOUS
Status: DISCONTINUED | OUTPATIENT
Start: 2024-11-21 | End: 2024-11-21 | Stop reason: SDUPTHER

## 2024-11-21 RX ORDER — ONDANSETRON 2 MG/ML
4 INJECTION INTRAMUSCULAR; INTRAVENOUS EVERY 6 HOURS PRN
Status: DISCONTINUED | OUTPATIENT
Start: 2024-11-21 | End: 2024-11-26 | Stop reason: HOSPADM

## 2024-11-21 RX ORDER — EPHEDRINE SULFATE 50 MG/ML
INJECTION INTRAVENOUS
Status: DISCONTINUED | OUTPATIENT
Start: 2024-11-21 | End: 2024-11-21 | Stop reason: SDUPTHER

## 2024-11-21 RX ORDER — SODIUM CHLORIDE 9 MG/ML
INJECTION, SOLUTION INTRAVENOUS PRN
Status: CANCELLED | OUTPATIENT
Start: 2024-11-21

## 2024-11-21 RX ORDER — FAMOTIDINE 20 MG/1
20 TABLET, FILM COATED ORAL 2 TIMES DAILY
Status: DISCONTINUED | OUTPATIENT
Start: 2024-11-21 | End: 2024-11-26 | Stop reason: HOSPADM

## 2024-11-21 RX ORDER — OXYCODONE HYDROCHLORIDE 5 MG/1
5-10 TABLET ORAL EVERY 6 HOURS PRN
Qty: 40 TABLET | Refills: 0 | Status: SHIPPED | OUTPATIENT
Start: 2024-11-21 | End: 2024-11-26

## 2024-11-21 RX ORDER — ONDANSETRON 2 MG/ML
INJECTION INTRAMUSCULAR; INTRAVENOUS
Status: DISCONTINUED | OUTPATIENT
Start: 2024-11-21 | End: 2024-11-21 | Stop reason: SDUPTHER

## 2024-11-21 RX ORDER — OXYCODONE HYDROCHLORIDE 5 MG/1
5 TABLET ORAL EVERY 4 HOURS PRN
Status: DISCONTINUED | OUTPATIENT
Start: 2024-11-21 | End: 2024-11-22

## 2024-11-21 RX ORDER — HYDROMORPHONE HYDROCHLORIDE 1 MG/ML
0.5 INJECTION, SOLUTION INTRAMUSCULAR; INTRAVENOUS; SUBCUTANEOUS EVERY 5 MIN PRN
Status: DISCONTINUED | OUTPATIENT
Start: 2024-11-21 | End: 2024-11-21 | Stop reason: HOSPADM

## 2024-11-21 RX ORDER — POLYETHYLENE GLYCOL 3350 17 G/17G
17 POWDER, FOR SOLUTION ORAL DAILY
Status: DISCONTINUED | OUTPATIENT
Start: 2024-11-22 | End: 2024-11-26 | Stop reason: HOSPADM

## 2024-11-21 RX ORDER — TRANEXAMIC ACID 100 MG/ML
INJECTION, SOLUTION INTRAVENOUS PRN
Status: DISCONTINUED | OUTPATIENT
Start: 2024-11-21 | End: 2024-11-21 | Stop reason: HOSPADM

## 2024-11-21 RX ORDER — SODIUM CHLORIDE 9 MG/ML
INJECTION, SOLUTION INTRAVENOUS PRN
Status: DISCONTINUED | OUTPATIENT
Start: 2024-11-21 | End: 2024-11-26 | Stop reason: HOSPADM

## 2024-11-21 RX ORDER — HYDRALAZINE HYDROCHLORIDE 20 MG/ML
10 INJECTION INTRAMUSCULAR; INTRAVENOUS
Status: DISCONTINUED | OUTPATIENT
Start: 2024-11-21 | End: 2024-11-21 | Stop reason: HOSPADM

## 2024-11-21 RX ORDER — SODIUM CHLORIDE 9 MG/ML
INJECTION, SOLUTION INTRAVENOUS PRN
Status: DISCONTINUED | OUTPATIENT
Start: 2024-11-21 | End: 2024-11-21 | Stop reason: HOSPADM

## 2024-11-21 RX ORDER — SODIUM CHLORIDE 9 MG/ML
INJECTION, SOLUTION INTRAVENOUS CONTINUOUS
Status: DISCONTINUED | OUTPATIENT
Start: 2024-11-21 | End: 2024-11-24

## 2024-11-21 RX ORDER — SODIUM CHLORIDE, SODIUM LACTATE, POTASSIUM CHLORIDE, CALCIUM CHLORIDE 600; 310; 30; 20 MG/100ML; MG/100ML; MG/100ML; MG/100ML
INJECTION, SOLUTION INTRAVENOUS CONTINUOUS
Status: DISCONTINUED | OUTPATIENT
Start: 2024-11-21 | End: 2024-11-21 | Stop reason: HOSPADM

## 2024-11-21 RX ORDER — HYDROXYZINE HYDROCHLORIDE 10 MG/1
10 TABLET, FILM COATED ORAL EVERY 8 HOURS PRN
Status: DISCONTINUED | OUTPATIENT
Start: 2024-11-21 | End: 2024-11-26 | Stop reason: HOSPADM

## 2024-11-21 RX ORDER — BISACODYL 5 MG/1
5 TABLET, DELAYED RELEASE ORAL DAILY PRN
Status: CANCELLED | OUTPATIENT
Start: 2024-11-21

## 2024-11-21 RX ORDER — HYDROMORPHONE HYDROCHLORIDE 1 MG/ML
INJECTION, SOLUTION INTRAMUSCULAR; INTRAVENOUS; SUBCUTANEOUS
Status: DISCONTINUED | OUTPATIENT
Start: 2024-11-21 | End: 2024-11-21 | Stop reason: SDUPTHER

## 2024-11-21 RX ORDER — SODIUM CHLORIDE 9 MG/ML
INJECTION, SOLUTION INTRAVENOUS CONTINUOUS
Status: CANCELLED | OUTPATIENT
Start: 2024-11-21

## 2024-11-21 RX ADMIN — EPHEDRINE SULFATE 5 MG: 50 INJECTION INTRAVENOUS at 12:26

## 2024-11-21 RX ADMIN — FENTANYL CITRATE 25 MCG: 50 INJECTION INTRAMUSCULAR; INTRAVENOUS at 18:30

## 2024-11-21 RX ADMIN — CEFAZOLIN 2 G: 1 INJECTION, POWDER, FOR SOLUTION INTRAMUSCULAR; INTRAVENOUS at 11:45

## 2024-11-21 RX ADMIN — MEPIVACAINE HYDROCHLORIDE 52.5 MG: 15 INJECTION, SOLUTION EPIDURAL; INFILTRATION at 11:41

## 2024-11-21 RX ADMIN — EPHEDRINE SULFATE 5 MG: 50 INJECTION INTRAVENOUS at 13:40

## 2024-11-21 RX ADMIN — WATER 2000 MG: 1 INJECTION INTRAMUSCULAR; INTRAVENOUS; SUBCUTANEOUS at 22:11

## 2024-11-21 RX ADMIN — OXYCODONE 5 MG: 5 TABLET ORAL at 22:12

## 2024-11-21 RX ADMIN — PROPOFOL 40 MCG/KG/MIN: 10 INJECTION, EMULSION INTRAVENOUS at 11:33

## 2024-11-21 RX ADMIN — FENTANYL CITRATE 50 MCG: 50 INJECTION, SOLUTION INTRAMUSCULAR; INTRAVENOUS at 14:23

## 2024-11-21 RX ADMIN — MIDAZOLAM 2 MG: 1 INJECTION INTRAMUSCULAR; INTRAVENOUS at 11:21

## 2024-11-21 RX ADMIN — SODIUM CHLORIDE, POTASSIUM CHLORIDE, SODIUM LACTATE AND CALCIUM CHLORIDE: 600; 310; 30; 20 INJECTION, SOLUTION INTRAVENOUS at 11:11

## 2024-11-21 RX ADMIN — HYDROMORPHONE HYDROCHLORIDE 0.5 MG: 1 INJECTION, SOLUTION INTRAMUSCULAR; INTRAVENOUS; SUBCUTANEOUS at 14:36

## 2024-11-21 RX ADMIN — EPHEDRINE SULFATE 5 MG: 50 INJECTION INTRAVENOUS at 13:51

## 2024-11-21 RX ADMIN — ONDANSETRON 4 MG: 2 INJECTION INTRAMUSCULAR; INTRAVENOUS at 14:24

## 2024-11-21 RX ADMIN — HYDROMORPHONE HYDROCHLORIDE 0.5 MG: 1 INJECTION, SOLUTION INTRAMUSCULAR; INTRAVENOUS; SUBCUTANEOUS at 14:39

## 2024-11-21 RX ADMIN — SODIUM CHLORIDE, PRESERVATIVE FREE 10 ML: 5 INJECTION INTRAVENOUS at 22:13

## 2024-11-21 RX ADMIN — FENTANYL CITRATE 25 MCG: 50 INJECTION INTRAMUSCULAR; INTRAVENOUS at 15:28

## 2024-11-21 RX ADMIN — SODIUM CHLORIDE: 9 INJECTION, SOLUTION INTRAVENOUS at 22:34

## 2024-11-21 RX ADMIN — SENNOSIDES AND DOCUSATE SODIUM 1 TABLET: 50; 8.6 TABLET ORAL at 22:12

## 2024-11-21 RX ADMIN — HYDROMORPHONE HYDROCHLORIDE 0.5 MG: 1 INJECTION, SOLUTION INTRAMUSCULAR; INTRAVENOUS; SUBCUTANEOUS at 17:09

## 2024-11-21 RX ADMIN — DEXAMETHASONE SODIUM PHOSPHATE 4 MG: 4 INJECTION INTRA-ARTICULAR; INTRALESIONAL; INTRAMUSCULAR; INTRAVENOUS; SOFT TISSUE at 14:24

## 2024-11-21 RX ADMIN — TRANEXAMIC ACID 1000 MG: 100 INJECTION, SOLUTION INTRAVENOUS at 11:47

## 2024-11-21 RX ADMIN — LIDOCAINE HYDROCHLORIDE 40 MG: 20 INJECTION, SOLUTION EPIDURAL; INFILTRATION; INTRACAUDAL; PERINEURAL at 11:33

## 2024-11-21 RX ADMIN — FAMOTIDINE 20 MG: 20 TABLET, FILM COATED ORAL at 22:12

## 2024-11-21 RX ADMIN — FENTANYL CITRATE 50 MCG: 50 INJECTION, SOLUTION INTRAMUSCULAR; INTRAVENOUS at 14:27

## 2024-11-21 RX ADMIN — ACETAMINOPHEN 650 MG: 325 TABLET ORAL at 22:12

## 2024-11-21 RX ADMIN — PHENYLEPHRINE HYDROCHLORIDE 50 MCG/MIN: 10 INJECTION INTRAVENOUS at 11:44

## 2024-11-21 RX ADMIN — PROPOFOL 25 MG: 10 INJECTION, EMULSION INTRAVENOUS at 11:32

## 2024-11-21 RX ADMIN — ROPIVACAINE HYDROCHLORIDE 30 ML: 5 INJECTION, SOLUTION EPIDURAL; INFILTRATION; PERINEURAL at 11:20

## 2024-11-21 ASSESSMENT — PAIN SCALES - GENERAL
PAINLEVEL_OUTOF10: 4
PAINLEVEL_OUTOF10: 3
PAINLEVEL_OUTOF10: 6
PAINLEVEL_OUTOF10: 3
PAINLEVEL_OUTOF10: 2
PAINLEVEL_OUTOF10: 6
PAINLEVEL_OUTOF10: 4

## 2024-11-21 ASSESSMENT — PAIN DESCRIPTION - DESCRIPTORS
DESCRIPTORS: SORE
DESCRIPTORS: SORE
DESCRIPTORS: ACHING;SORE

## 2024-11-21 ASSESSMENT — PAIN DESCRIPTION - LOCATION
LOCATION: KNEE

## 2024-11-21 ASSESSMENT — PAIN DESCRIPTION - ORIENTATION
ORIENTATION: RIGHT

## 2024-11-21 ASSESSMENT — PAIN - FUNCTIONAL ASSESSMENT: PAIN_FUNCTIONAL_ASSESSMENT: 0-10

## 2024-11-21 NOTE — H&P
[facility] PRE-OP HISTORY AND PHYSICAL    Subjective:     Patient is a 73 y.o. male presented with a history of right knee pain.  Onset of symptoms was gradual with gradually worsening course since that time. He is being admitted for surgical management of this condition.       Patient Active Problem List    Diagnosis Date Noted    Osteoarthritis of right knee 06/25/2024    Osteoarthritis of left knee, unspecified osteoarthritis type 02/08/2024    Encounter for preadmission testing 02/05/2024     Past Medical History:   Diagnosis Date    Arthritis of low back     Basal cell carcinoma (BCC)     Osteoarthritis of both knees     Spinal stenosis of lumbar region       Past Surgical History:   Procedure Laterality Date    APPENDECTOMY      COLONOSCOPY N/A 2/19/2018    COLONOSCOPY performed by Miguelito Lunsford MD at [facility] ENDOSCOPY    COLONOSCOPY N/A 09/26/2023    COLONOSCOPY performed by Miguelito Lunsford MD at [facility] ENDOSCOPY    KYPHOSIS SURGERY      SKIN BIOPSY      LT ARM    TONSILLECTOMY      TOTAL KNEE ARTHROPLASTY Left 02/08/2024    LEFT TOTAL KNEE ARTHROPLASTY (GEN/BLOCK) performed by Ashley Hu MD at [facility] MAIN OR      Prior to Admission medications    Medication Sig Start Date End Date Taking? Authorizing Provider   NAPROXEN PO Take 2 tablets by mouth every morning    Michael Levine MD   NIACIN ER PO Take 1 tablet by mouth daily    Michael Levine MD   acetaminophen (TYLENOL) 325 MG tablet Take 2 tablets by mouth every 6 hours for 7 days  Patient taking differently: Take 2 tablets by mouth every 6 hours as needed for Pain 2/13/24 2/20/24  Roxy Perez, APRN - NP   b complex vitamins capsule Take 1 capsule by mouth every morning    Michael Levine MD   turmeric 500 MG CAPS Take 1 capsule by mouth daily    Michael Levine MD   Red Yeast Rice Extract (RED YEAST RICE PO) Take 1,000 mg by mouth daily    Automatic Reconciliation, Ar   vitamin D (CHOLECALCIFEROL) 125 MCG (5000 UT) CAPS

## 2024-11-21 NOTE — PERIOP NOTE
TRANSFER - OUT REPORT:    Verbal report given to Margot(name) on Chuy Krause  being transferred to 577(unit) for routine progression of patient care       Report consisted of patient’s Situation, Background, Assessment and   Recommendations(SBAR).     Time Pre op antibiotic given:1145  Anesthesia Stop time: 1439    Information from the following report(s) OR Summary, Procedure Summary, Intake/Output, MAR, Recent Results, and Cardiac Rhythm NSR  was reviewed with the receiving nurse.    Opportunity for questions and clarification was provided.     Is the patient on 02? No         Is the patient on a monitor? No    Is the nurse transporting with the patient? No    At transfer, are there Patient Belongings with the patient?  If Yes, please note/list:2 bags of clothes, looking for cane that is missing     Surgical Waiting Area notified of patient's transfer from PACU? Yes

## 2024-11-21 NOTE — ANESTHESIA PROCEDURE NOTES
Peripheral Block    Patient location during procedure: pre-op  Reason for block: post-op pain management and at surgeon's request  Start time: 11/21/2024 11:20 AM  Staffing  Performed: anesthesiologist   Anesthesiologist: Mar Lake DO  Performed by: Mar Lake DO  Authorized by: Mra Lake DO    Preanesthetic Checklist  Completed: patient identified, IV checked, site marked, risks and benefits discussed, surgical/procedural consents, equipment checked, pre-op evaluation, timeout performed, anesthesia consent given, oxygen available, monitors applied/VS acknowledged and fire risk safety assessment completed and verbalized  Peripheral Block   Patient position: supine  Prep: ChloraPrep  Provider prep: mask and sterile gloves  Patient monitoring: cardiac monitor, continuous pulse ox, frequent blood pressure checks, IV access and oxygen  Block type: Saphenous  Laterality: right  Injection technique: single-shot  Guidance: ultrasound guided  Local infiltration: lidocaine  Infiltration strength: 1 %  Local infiltration: lidocaine  Dose: 2 mLDose: 30 mL    Needle   Needle type: insulated echogenic nerve stimulator needle   Needle gauge: 21 G  Needle localization: anatomical landmarks and ultrasound guidance  Needle length: 10 cm  Assessment   Injection assessment: negative aspiration for heme, no paresthesia on injection, local visualized surrounding nerve on ultrasound and no intravascular symptoms  Paresthesia pain: none  Slow fractionated injection: yes  Hemodynamics: stable  Outcomes: uncomplicated and patient tolerated procedure well    Medications Administered  ROPivacaine (NAROPIN) 0.25% in sodium chloride (Mixture components: ROPivacaine 0.5% Soln, 10 mL; sodium chloride 0.9 % Soln, 10 mL) - Perineural   30 mL - 11/21/2024 11:20:00 AM

## 2024-11-21 NOTE — ANESTHESIA POSTPROCEDURE EVALUATION
Department of Anesthesiology  Postprocedure Note    Patient: Chuy Krause  MRN: [id number]  YOB: 1950  Date of evaluation: 11/21/2024    Procedure Summary       Date: 11/21/24 Room / Location: [facility] MAIN OR [address] [place] [address] MAIN OR    Anesthesia Start: 1123 Anesthesia Stop: 1439    Procedure: RIGHT TOTAL KNEE  ARTHROPLASTY (Right: Knee) Diagnosis:       Osteoarthritis of right knee      (Osteoarthritis of right knee [M17.11])    Providers: Ashley Hu MD Responsible Provider: Mar Lake DO    Anesthesia Type: MAC, Spinal, Regional ASA Status: 2            Anesthesia Type: MAC, Spinal, Regional    Michaela Phase I: Michaela Score: 10    Michaela Phase II:      Anesthesia Post Evaluation  Post-Anesthesia Evaluation and Assessment    Patient: Chuy Krause MRN: [id number]  SSN: [id number]    YOB: 1950  Age: 73 y.o.  Sex: male      I have evaluated the patient and they are stable and ready for discharge from the PACU.     Cardiovascular Function/Vital Signs  Visit Vitals  /60   Pulse 80   Temp 97 °F (36.1 °C) (Oral)   Resp 12   SpO2 100%       Patient is status post General anesthesia for Procedure(s) with comments:  RIGHT TOTAL KNEE  ARTHROPLASTY - RIGHT TOTAL KNEE ARTHROPLASTY.    Nausea/Vomiting: None    Postoperative hydration reviewed and adequate.    Pain:      Managed    Neurological Status:       Block resolving    Mental Status, Level of Consciousness: Alert and oriented     Pulmonary Status:       Adequate oxygenation and airway patent    Complications related to anesthesia: None    Post-anesthesia assessment completed. No concerns    Signed By: Artem Meyer MD     November 21, 2024                No notable events documented.

## 2024-11-21 NOTE — DISCHARGE INSTRUCTIONS
[facility]  Total Knee Replacement   Post-Op Discharge Instructions Knee   Dr. Ashley Hu    Patient Name  Chuy Krause  Date of procedure  [unfilled]    Procedure  Procedure(s) with comments:  RIGHT TOTAL KNEE  ARTHROPLASTY - RIGHT TOTAL KNEE ARTHROPLASTY  Surgeon  Surgeons and Role:     * Ashley Hu MD - Primary  PCP: @PCP@    Follow up care  Follow up visit with Dr. Hu in 2-3 weeks.  Call [phone], extension [phone] to make an appointment    Activity at home  Take a short walk every hour; except at night when sleeping  Do your Home Exercise Program 3 times every day   After exercising lie down and elevate your leg on pillows for 15-30 minutes to decrease swelling  Refer to your patient notebook for more information   Preventing blood clots  Take Warfarin (Coumadin) every day as prescribed.  Do not take aspirin or anti-inflammatory medicine while taking Warfarin  Wear elastic stockings (TEDS) for 4 weeks. You may remove them daily for 1 hour when shoering/sponge-bathing.  Call Dr. Hu if you have side effects of blood thinning medication: bleeding, bruising, upset stomach, or diarrhea.   Call Dr. Hu for signs of a blood clot in your leg: calf pain, tenderness, redness, swelling of lower leg   Preventing lung congestion  Use your incentive spirometer 4 times a day; do 10 repetitions each time  Remember to keep the small blue ball between the two arrows when taking a slow, deep breath   Pain Management  Get up and walk a short distance to relieve pain and stiffness.  Place ice wrap on your knee except when you are walking. The gel ice packs should be changed about every 4 hours  Elevate your leg on pillows for 15-30 minutes   If needed, take a narcotic pain pill every 4-6 hours as prescribed.  Take all medications with a small amount of food.   As your pain decreases, take the narcotics less often or take ½ of a pill  Call Dr. Hu if you have side effects from your

## 2024-11-21 NOTE — ANESTHESIA PRE PROCEDURE
Department of Anesthesiology  Preprocedure Note       Name:  Chuy Krause   Age:  73 y.o.  :  1950                                          MRN:  [id number]         Date:  2024      Surgeon: Surgeon(s):  Ashley Hu MD    Procedure: Procedure(s):  RIGHT TOTAL KNEE  ARTHROPLASTY    Medications prior to admission:   Prior to Admission medications    Medication Sig Start Date End Date Taking? Authorizing Provider   oxyCODONE (ROXICODONE) 5 MG immediate release tablet Take 1-2 tablets by mouth every 6 hours as needed for Pain for up to 7 days. Max Daily Amount: 40 mg 24 Yes Ashley Hu MD   warfarin (COUMADIN) 2.5 MG tablet One po daily until directed otherwise 24  Yes Ashley Hu MD   NAPROXEN PO Take 2 tablets by mouth every morning   Yes Michael Levine MD   NIACIN ER PO Take 1 tablet by mouth daily   Yes Michael Levine MD   acetaminophen (TYLENOL) 325 MG tablet Take 2 tablets by mouth every 6 hours for 7 days  Patient taking differently: Take 2 tablets by mouth every 6 hours as needed for Pain 24 Yes Roxy Perez APRN - NP   b complex vitamins capsule Take 1 capsule by mouth every morning   Yes ProviderMichael MD   turmeric 500 MG CAPS Take 1 capsule by mouth daily   Yes ProviderMichael MD   Red Yeast Rice Extract (RED YEAST RICE PO) Take 1,000 mg by mouth daily   Yes Automatic Reconciliation, Ar   vitamin D (CHOLECALCIFEROL) 125 MCG (5000 UT) CAPS capsule Take 1 capsule by mouth every morning   Yes Automatic Reconciliation, Ar   metFORMIN (GLUCOPHAGE) 500 MG tablet Take 1 tablet by mouth 2 times daily (with meals)   Yes Automatic Reconciliation, Ar       Current medications:    Current Facility-Administered Medications   Medication Dose Route Frequency Provider Last Rate Last Admin   • lidocaine PF 1 % injection 1 mL  1 mL IntraDERmal Once PRN Mar Lake, DO       • fentaNYL (SUBLIMAZE) injection 100 mcg  100 mcg

## 2024-11-21 NOTE — OP NOTE
[address]  [address]                            OPERATIVE REPORT      PATIENT NAME: CAMERON AVILA            : 1950  MED REC NO: [id number]                       ROOM: OR  ACCOUNT NO: [id number]                       ADMIT DATE: 2024  PROVIDER: Ashley Hu MD    DATE OF SERVICE:  2024    PREOPERATIVE DIAGNOSES:  End-stage osteoarthritis of right knee.    POSTOPERATIVE DIAGNOSES:  End-stage osteoarthritis of right knee.    PROCEDURES PERFORMED:  Imageless total knee arthroplasty of right knee.    SURGEON:  Ashley Hu MD    ASSISTANT:  First Assist, Dede Gallagher.  Second Assist, Ernesto Ramos.    ANESTHESIA:  Spinal with MAC and 266 mg of Exparel.    ESTIMATED BLOOD LOSS:  Less than 150 mL.     Crystalloids given.    SPECIMENS REMOVED:  Bone discarded.     COMPLICATIONS:  Zero.    IMPLANTS:  Exactech Truliant knee, size 5 femur, size 5 tibia, 15 CRC poly and a 41 patella.    INDICATIONS:  The patient is a 73-year-old gentleman whom I have taken care of before in my office.  He has bilateral knee osteoarthritis, which led to a total knee replacement on the left.  His right knee became more and more problematic.  He had a varus deformity with bone-on-bone arthritis.  He wished to proceed with a total knee replacement.  It was reasonable and appropriate.  I refreshed his memory regarding the risk of surgery such as but not limited to postoperative pain, numbness and tingling, stiffness, loss of range of motion, continued knee pain, revision surgery, infection, DVT, PE, MI, CVA, and other unforeseen events that could occur in a perioperative fashion.  The patient understood metal and plastic will be implanted in the body.  The patient also understood a significant amount of physical therapy required postop.  Understanding all the risks and benefits as mentioned above, the patient wished to proceed, signed consent, was

## 2024-11-21 NOTE — BRIEF OP NOTE
Brief Postoperative Note      Patient: Chuy Krause  YOB: 1950  MRN: [id number]    Date of Procedure: 11/21/2024    Pre-Op Diagnosis Codes:      * Osteoarthritis of right knee [M17.11]    Post-Op Diagnosis: Same       Procedure(s):  RIGHT TOTAL KNEE  ARTHROPLASTY    Surgeon(s):  Ashley Hu MD Wie, Benjamin J, MD    Assistant:  Physician Assistant: Oneyda Gallagher PA-C    Anesthesia: spinal with mac    Estimated Blood Loss (mL): less than 100     Complications: None    Specimens:   Bone discarded    Implants:  Implant Name Type Inv. Item Serial No.  Lot No. LRB No. Used Action   CEMENT BNE MED VISC 80 GM GENTAMICIN PALACOS MV+G PRO - SNA  CEMENT BNE MED VISC 80 GM GENTAMICIN PALACOS MV+G PRO NA [employer] [id number] Right 1 Implanted   IMPLANT PATELLAR THK 41 MM ACTIVIT-E KNEE 3 PEG STRL - [id number]  IMPLANT PATELLAR THK 41 MM ACTIVIT-E KNEE 3 PEG STRL [id number] [employer] NA Right 1 Implanted   COMPONENT FEM SZ 5 RT CRUC RET BILLY TRULIANT - [id number]  COMPONENT FEM SZ 5 RT CRUC RET BILLY TRULIANT [id number] [employer] NA Right 1 Implanted   COMPONENT TIB BILLY FIT 5F 5T LOGIC - [id number]  COMPONENT TIB BILLY FIT 5F 5T LOGIC [id number] [employer] NA Right 1 Implanted   INSERT TIB ACTIVIT-E POLYETHYL CR CONSTRND STRL TRULIANT SZ 5 12MM - [id number]  INSERT TIB ACTIVIT-E POLYETHYL CR CONSTRND STRL TRULIANT SZ 5 12MM [id number] [employer] NA Right 1 Implanted             Electronically signed by Ashley Hu MD on 11/21/2024 at 1:42 PM

## 2024-11-22 PROBLEM — M17.11 OSTEOARTHRITIS OF RIGHT KNEE, UNSPECIFIED OSTEOARTHRITIS TYPE: Status: ACTIVE | Noted: 2024-11-22

## 2024-11-22 LAB
ANION GAP SERPL CALC-SCNC: 9 MMOL/L (ref 2–12)
BUN SERPL-MCNC: 18 MG/DL (ref 6–20)
BUN/CREAT SERPL: 21 (ref 12–20)
CALCIUM SERPL-MCNC: 8.6 MG/DL (ref 8.5–10.1)
CHLORIDE SERPL-SCNC: 110 MMOL/L (ref 97–108)
CO2 SERPL-SCNC: 22 MMOL/L (ref 21–32)
CREAT SERPL-MCNC: 0.85 MG/DL (ref 0.7–1.3)
GLUCOSE SERPL-MCNC: 124 MG/DL (ref 65–100)
HCT VFR BLD AUTO: 37.1 % (ref 36.6–50.3)
HGB BLD-MCNC: 12.7 G/DL (ref 12.1–17)
POTASSIUM SERPL-SCNC: 4.2 MMOL/L (ref 3.5–5.1)
SODIUM SERPL-SCNC: 141 MMOL/L (ref 136–145)

## 2024-11-22 PROCEDURE — G0378 HOSPITAL OBSERVATION PER HR: HCPCS

## 2024-11-22 PROCEDURE — 6360000002 HC RX W HCPCS: Performed by: STUDENT IN AN ORGANIZED HEALTH CARE EDUCATION/TRAINING PROGRAM

## 2024-11-22 PROCEDURE — 6370000000 HC RX 637 (ALT 250 FOR IP): Performed by: STUDENT IN AN ORGANIZED HEALTH CARE EDUCATION/TRAINING PROGRAM

## 2024-11-22 PROCEDURE — 97535 SELF CARE MNGMENT TRAINING: CPT

## 2024-11-22 PROCEDURE — 85014 HEMATOCRIT: CPT

## 2024-11-22 PROCEDURE — 1100000000 HC RM PRIVATE

## 2024-11-22 PROCEDURE — 97162 PT EVAL MOD COMPLEX 30 MIN: CPT

## 2024-11-22 PROCEDURE — APPNB60 APP NON BILLABLE TIME 46-60 MINS: Performed by: NURSE PRACTITIONER

## 2024-11-22 PROCEDURE — 85018 HEMOGLOBIN: CPT

## 2024-11-22 PROCEDURE — 97530 THERAPEUTIC ACTIVITIES: CPT

## 2024-11-22 PROCEDURE — 97165 OT EVAL LOW COMPLEX 30 MIN: CPT

## 2024-11-22 PROCEDURE — 6370000000 HC RX 637 (ALT 250 FOR IP): Performed by: NURSE PRACTITIONER

## 2024-11-22 PROCEDURE — 80048 BASIC METABOLIC PNL TOTAL CA: CPT

## 2024-11-22 PROCEDURE — 2580000003 HC RX 258: Performed by: STUDENT IN AN ORGANIZED HEALTH CARE EDUCATION/TRAINING PROGRAM

## 2024-11-22 RX ORDER — WARFARIN SODIUM 5 MG/1
7.5 TABLET ORAL
Status: COMPLETED | OUTPATIENT
Start: 2024-11-22 | End: 2024-11-22

## 2024-11-22 RX ORDER — TRAMADOL HYDROCHLORIDE 50 MG/1
50 TABLET ORAL EVERY 6 HOURS PRN
Status: DISCONTINUED | OUTPATIENT
Start: 2024-11-22 | End: 2024-11-26

## 2024-11-22 RX ORDER — KETOROLAC TROMETHAMINE 30 MG/ML
15 INJECTION, SOLUTION INTRAMUSCULAR; INTRAVENOUS EVERY 6 HOURS
Status: DISPENSED | OUTPATIENT
Start: 2024-11-22 | End: 2024-11-23

## 2024-11-22 RX ORDER — OXYCODONE HYDROCHLORIDE 5 MG/1
5 TABLET ORAL EVERY 4 HOURS PRN
Status: DISCONTINUED | OUTPATIENT
Start: 2024-11-22 | End: 2024-11-26 | Stop reason: HOSPADM

## 2024-11-22 RX ADMIN — OXYCODONE 5 MG: 5 TABLET ORAL at 09:01

## 2024-11-22 RX ADMIN — SODIUM CHLORIDE: 9 INJECTION, SOLUTION INTRAVENOUS at 04:21

## 2024-11-22 RX ADMIN — SENNOSIDES AND DOCUSATE SODIUM 1 TABLET: 50; 8.6 TABLET ORAL at 09:01

## 2024-11-22 RX ADMIN — SENNOSIDES AND DOCUSATE SODIUM 1 TABLET: 50; 8.6 TABLET ORAL at 21:25

## 2024-11-22 RX ADMIN — FAMOTIDINE 20 MG: 20 TABLET, FILM COATED ORAL at 21:25

## 2024-11-22 RX ADMIN — ACETAMINOPHEN 650 MG: 325 TABLET ORAL at 09:00

## 2024-11-22 RX ADMIN — WATER 2000 MG: 1 INJECTION INTRAMUSCULAR; INTRAVENOUS; SUBCUTANEOUS at 05:54

## 2024-11-22 RX ADMIN — OXYCODONE 5 MG: 5 TABLET ORAL at 21:25

## 2024-11-22 RX ADMIN — POLYETHYLENE GLYCOL 3350 17 G: 17 POWDER, FOR SOLUTION ORAL at 09:02

## 2024-11-22 RX ADMIN — WARFARIN SODIUM 7.5 MG: 5 TABLET ORAL at 21:40

## 2024-11-22 RX ADMIN — SODIUM CHLORIDE, PRESERVATIVE FREE 10 ML: 5 INJECTION INTRAVENOUS at 21:26

## 2024-11-22 RX ADMIN — ACETAMINOPHEN 650 MG: 325 TABLET ORAL at 04:17

## 2024-11-22 RX ADMIN — ACETAMINOPHEN 650 MG: 325 TABLET ORAL at 21:25

## 2024-11-22 RX ADMIN — FAMOTIDINE 20 MG: 20 TABLET, FILM COATED ORAL at 09:02

## 2024-11-22 RX ADMIN — SODIUM CHLORIDE, PRESERVATIVE FREE 10 ML: 5 INJECTION INTRAVENOUS at 21:30

## 2024-11-22 ASSESSMENT — PAIN SCALES - GENERAL
PAINLEVEL_OUTOF10: 0
PAINLEVEL_OUTOF10: 3
PAINLEVEL_OUTOF10: 3
PAINLEVEL_OUTOF10: 8
PAINLEVEL_OUTOF10: 7

## 2024-11-22 ASSESSMENT — PAIN DESCRIPTION - PAIN TYPE
TYPE: SURGICAL PAIN
TYPE: ACUTE PAIN;SURGICAL PAIN
TYPE: SURGICAL PAIN
TYPE: SURGICAL PAIN

## 2024-11-22 ASSESSMENT — PAIN DESCRIPTION - DESCRIPTORS
DESCRIPTORS: SORE
DESCRIPTORS: THROBBING
DESCRIPTORS: ACHING
DESCRIPTORS: SORE

## 2024-11-22 ASSESSMENT — PAIN - FUNCTIONAL ASSESSMENT
PAIN_FUNCTIONAL_ASSESSMENT: PREVENTS OR INTERFERES SOME ACTIVE ACTIVITIES AND ADLS

## 2024-11-22 ASSESSMENT — PAIN DESCRIPTION - FREQUENCY
FREQUENCY: INTERMITTENT

## 2024-11-22 ASSESSMENT — PAIN DESCRIPTION - LOCATION: LOCATION: KNEE

## 2024-11-22 ASSESSMENT — PAIN DESCRIPTION - ORIENTATION
ORIENTATION: RIGHT

## 2024-11-23 LAB
INR PPP: 1 (ref 0.9–1.1)
PROTHROMBIN TIME: 10.9 SEC (ref 9–11.1)

## 2024-11-23 PROCEDURE — 1100000000 HC RM PRIVATE

## 2024-11-23 PROCEDURE — 6370000000 HC RX 637 (ALT 250 FOR IP): Performed by: STUDENT IN AN ORGANIZED HEALTH CARE EDUCATION/TRAINING PROGRAM

## 2024-11-23 PROCEDURE — 6370000000 HC RX 637 (ALT 250 FOR IP): Performed by: ORTHOPAEDIC SURGERY

## 2024-11-23 PROCEDURE — 97116 GAIT TRAINING THERAPY: CPT

## 2024-11-23 PROCEDURE — 85610 PROTHROMBIN TIME: CPT

## 2024-11-23 PROCEDURE — 6370000000 HC RX 637 (ALT 250 FOR IP): Performed by: NURSE PRACTITIONER

## 2024-11-23 PROCEDURE — 97110 THERAPEUTIC EXERCISES: CPT

## 2024-11-23 PROCEDURE — 2580000003 HC RX 258: Performed by: STUDENT IN AN ORGANIZED HEALTH CARE EDUCATION/TRAINING PROGRAM

## 2024-11-23 PROCEDURE — 36415 COLL VENOUS BLD VENIPUNCTURE: CPT

## 2024-11-23 PROCEDURE — 6360000002 HC RX W HCPCS: Performed by: NURSE PRACTITIONER

## 2024-11-23 RX ORDER — WARFARIN SODIUM 5 MG/1
5 TABLET ORAL
Status: COMPLETED | OUTPATIENT
Start: 2024-11-23 | End: 2024-11-23

## 2024-11-23 RX ADMIN — ACETAMINOPHEN 650 MG: 325 TABLET ORAL at 14:45

## 2024-11-23 RX ADMIN — OXYCODONE 5 MG: 5 TABLET ORAL at 05:29

## 2024-11-23 RX ADMIN — POLYETHYLENE GLYCOL 3350 17 G: 17 POWDER, FOR SOLUTION ORAL at 08:19

## 2024-11-23 RX ADMIN — FAMOTIDINE 20 MG: 20 TABLET, FILM COATED ORAL at 20:22

## 2024-11-23 RX ADMIN — OXYCODONE 5 MG: 5 TABLET ORAL at 20:23

## 2024-11-23 RX ADMIN — FAMOTIDINE 20 MG: 20 TABLET, FILM COATED ORAL at 08:20

## 2024-11-23 RX ADMIN — OXYCODONE 5 MG: 5 TABLET ORAL at 09:50

## 2024-11-23 RX ADMIN — ACETAMINOPHEN 650 MG: 325 TABLET ORAL at 20:22

## 2024-11-23 RX ADMIN — SODIUM CHLORIDE, PRESERVATIVE FREE 10 ML: 5 INJECTION INTRAVENOUS at 09:54

## 2024-11-23 RX ADMIN — OXYCODONE 5 MG: 5 TABLET ORAL at 14:44

## 2024-11-23 RX ADMIN — ACETAMINOPHEN 650 MG: 325 TABLET ORAL at 08:19

## 2024-11-23 RX ADMIN — SENNOSIDES AND DOCUSATE SODIUM 1 TABLET: 50; 8.6 TABLET ORAL at 08:23

## 2024-11-23 RX ADMIN — SENNOSIDES AND DOCUSATE SODIUM 1 TABLET: 50; 8.6 TABLET ORAL at 20:22

## 2024-11-23 RX ADMIN — SODIUM CHLORIDE, PRESERVATIVE FREE 10 ML: 5 INJECTION INTRAVENOUS at 20:23

## 2024-11-23 RX ADMIN — ACETAMINOPHEN 650 MG: 325 TABLET ORAL at 05:25

## 2024-11-23 RX ADMIN — WARFARIN SODIUM 5 MG: 5 TABLET ORAL at 16:50

## 2024-11-23 RX ADMIN — KETOROLAC TROMETHAMINE 15 MG: 30 INJECTION, SOLUTION INTRAMUSCULAR at 02:21

## 2024-11-23 ASSESSMENT — PAIN DESCRIPTION - DESCRIPTORS
DESCRIPTORS: ACHING
DESCRIPTORS: ACHING;DISCOMFORT
DESCRIPTORS: ACHING;DISCOMFORT
DESCRIPTORS: ACHING

## 2024-11-23 ASSESSMENT — PAIN SCALES - GENERAL
PAINLEVEL_OUTOF10: 4
PAINLEVEL_OUTOF10: 8
PAINLEVEL_OUTOF10: 7
PAINLEVEL_OUTOF10: 8
PAINLEVEL_OUTOF10: 0
PAINLEVEL_OUTOF10: 8
PAINLEVEL_OUTOF10: 8
PAINLEVEL_OUTOF10: 5
PAINLEVEL_OUTOF10: 0
PAINLEVEL_OUTOF10: 7
PAINLEVEL_OUTOF10: 7

## 2024-11-23 ASSESSMENT — PAIN DESCRIPTION - ORIENTATION
ORIENTATION: RIGHT

## 2024-11-23 ASSESSMENT — PAIN DESCRIPTION - LOCATION
LOCATION: KNEE

## 2024-11-23 ASSESSMENT — PAIN - FUNCTIONAL ASSESSMENT
PAIN_FUNCTIONAL_ASSESSMENT: ACTIVITIES ARE NOT PREVENTED
PAIN_FUNCTIONAL_ASSESSMENT: ACTIVITIES ARE NOT PREVENTED

## 2024-11-24 LAB
INR PPP: 1.1 (ref 0.9–1.1)
PROTHROMBIN TIME: 11.9 SEC (ref 9–11.1)

## 2024-11-24 PROCEDURE — 6370000000 HC RX 637 (ALT 250 FOR IP): Performed by: ORTHOPAEDIC SURGERY

## 2024-11-24 PROCEDURE — 6370000000 HC RX 637 (ALT 250 FOR IP): Performed by: NURSE PRACTITIONER

## 2024-11-24 PROCEDURE — 85610 PROTHROMBIN TIME: CPT

## 2024-11-24 PROCEDURE — 97116 GAIT TRAINING THERAPY: CPT

## 2024-11-24 PROCEDURE — 2580000003 HC RX 258: Performed by: STUDENT IN AN ORGANIZED HEALTH CARE EDUCATION/TRAINING PROGRAM

## 2024-11-24 PROCEDURE — 36415 COLL VENOUS BLD VENIPUNCTURE: CPT

## 2024-11-24 PROCEDURE — 1100000000 HC RM PRIVATE

## 2024-11-24 PROCEDURE — 6370000000 HC RX 637 (ALT 250 FOR IP): Performed by: STUDENT IN AN ORGANIZED HEALTH CARE EDUCATION/TRAINING PROGRAM

## 2024-11-24 PROCEDURE — 97530 THERAPEUTIC ACTIVITIES: CPT

## 2024-11-24 RX ORDER — WARFARIN SODIUM 5 MG/1
7.5 TABLET ORAL
Status: COMPLETED | OUTPATIENT
Start: 2024-11-24 | End: 2024-11-24

## 2024-11-24 RX ADMIN — ACETAMINOPHEN 650 MG: 325 TABLET ORAL at 05:16

## 2024-11-24 RX ADMIN — POLYETHYLENE GLYCOL 3350 17 G: 17 POWDER, FOR SOLUTION ORAL at 07:52

## 2024-11-24 RX ADMIN — FAMOTIDINE 20 MG: 20 TABLET, FILM COATED ORAL at 22:16

## 2024-11-24 RX ADMIN — ACETAMINOPHEN 650 MG: 325 TABLET ORAL at 22:14

## 2024-11-24 RX ADMIN — SODIUM CHLORIDE, PRESERVATIVE FREE 10 ML: 5 INJECTION INTRAVENOUS at 07:52

## 2024-11-24 RX ADMIN — ACETAMINOPHEN 650 MG: 325 TABLET ORAL at 10:52

## 2024-11-24 RX ADMIN — BACITRACIN 1 EACH: 500 OINTMENT TOPICAL at 22:16

## 2024-11-24 RX ADMIN — OXYCODONE 5 MG: 5 TABLET ORAL at 22:22

## 2024-11-24 RX ADMIN — OXYCODONE 5 MG: 5 TABLET ORAL at 01:08

## 2024-11-24 RX ADMIN — SODIUM CHLORIDE, PRESERVATIVE FREE 10 ML: 5 INJECTION INTRAVENOUS at 22:14

## 2024-11-24 RX ADMIN — SENNOSIDES AND DOCUSATE SODIUM 1 TABLET: 50; 8.6 TABLET ORAL at 22:16

## 2024-11-24 RX ADMIN — SENNOSIDES AND DOCUSATE SODIUM 1 TABLET: 50; 8.6 TABLET ORAL at 07:51

## 2024-11-24 RX ADMIN — OXYCODONE 5 MG: 5 TABLET ORAL at 05:17

## 2024-11-24 RX ADMIN — ACETAMINOPHEN 650 MG: 325 TABLET ORAL at 17:10

## 2024-11-24 RX ADMIN — FAMOTIDINE 20 MG: 20 TABLET, FILM COATED ORAL at 07:51

## 2024-11-24 RX ADMIN — WARFARIN SODIUM 7.5 MG: 5 TABLET ORAL at 12:30

## 2024-11-24 ASSESSMENT — PAIN SCALES - GENERAL
PAINLEVEL_OUTOF10: 7
PAINLEVEL_OUTOF10: 0
PAINLEVEL_OUTOF10: 0
PAINLEVEL_OUTOF10: 7
PAINLEVEL_OUTOF10: 0

## 2024-11-24 ASSESSMENT — PAIN DESCRIPTION - ORIENTATION
ORIENTATION: RIGHT

## 2024-11-24 ASSESSMENT — PAIN DESCRIPTION - DESCRIPTORS
DESCRIPTORS: ACHING

## 2024-11-24 ASSESSMENT — PAIN DESCRIPTION - LOCATION
LOCATION: KNEE

## 2024-11-25 LAB
INR PPP: 1.4 (ref 0.9–1.1)
PROTHROMBIN TIME: 14.5 SEC (ref 9–11.1)

## 2024-11-25 PROCEDURE — 97116 GAIT TRAINING THERAPY: CPT

## 2024-11-25 PROCEDURE — 6370000000 HC RX 637 (ALT 250 FOR IP): Performed by: STUDENT IN AN ORGANIZED HEALTH CARE EDUCATION/TRAINING PROGRAM

## 2024-11-25 PROCEDURE — 6370000000 HC RX 637 (ALT 250 FOR IP): Performed by: NURSE PRACTITIONER

## 2024-11-25 PROCEDURE — 85610 PROTHROMBIN TIME: CPT

## 2024-11-25 PROCEDURE — 1100000000 HC RM PRIVATE

## 2024-11-25 PROCEDURE — 6370000000 HC RX 637 (ALT 250 FOR IP): Performed by: ORTHOPAEDIC SURGERY

## 2024-11-25 PROCEDURE — 2580000003 HC RX 258: Performed by: STUDENT IN AN ORGANIZED HEALTH CARE EDUCATION/TRAINING PROGRAM

## 2024-11-25 PROCEDURE — 36415 COLL VENOUS BLD VENIPUNCTURE: CPT

## 2024-11-25 RX ORDER — WARFARIN SODIUM 5 MG/1
7.5 TABLET ORAL
Status: COMPLETED | OUTPATIENT
Start: 2024-11-25 | End: 2024-11-25

## 2024-11-25 RX ADMIN — TRAMADOL HYDROCHLORIDE 50 MG: 50 TABLET ORAL at 03:54

## 2024-11-25 RX ADMIN — OXYCODONE 5 MG: 5 TABLET ORAL at 18:14

## 2024-11-25 RX ADMIN — OXYCODONE 5 MG: 5 TABLET ORAL at 13:57

## 2024-11-25 RX ADMIN — OXYCODONE 5 MG: 5 TABLET ORAL at 09:30

## 2024-11-25 RX ADMIN — BACITRACIN 1 EACH: 500 OINTMENT TOPICAL at 09:34

## 2024-11-25 RX ADMIN — ACETAMINOPHEN 650 MG: 325 TABLET ORAL at 09:29

## 2024-11-25 RX ADMIN — FAMOTIDINE 20 MG: 20 TABLET, FILM COATED ORAL at 09:30

## 2024-11-25 RX ADMIN — BACITRACIN 1 EACH: 500 OINTMENT TOPICAL at 21:25

## 2024-11-25 RX ADMIN — POLYETHYLENE GLYCOL 3350 17 G: 17 POWDER, FOR SOLUTION ORAL at 09:30

## 2024-11-25 RX ADMIN — SENNOSIDES AND DOCUSATE SODIUM 1 TABLET: 50; 8.6 TABLET ORAL at 09:29

## 2024-11-25 RX ADMIN — ACETAMINOPHEN 650 MG: 325 TABLET ORAL at 21:19

## 2024-11-25 RX ADMIN — SODIUM CHLORIDE, PRESERVATIVE FREE 10 ML: 5 INJECTION INTRAVENOUS at 09:30

## 2024-11-25 RX ADMIN — SODIUM CHLORIDE, PRESERVATIVE FREE 10 ML: 5 INJECTION INTRAVENOUS at 21:19

## 2024-11-25 RX ADMIN — WARFARIN SODIUM 7.5 MG: 5 TABLET ORAL at 12:26

## 2024-11-25 RX ADMIN — SENNOSIDES AND DOCUSATE SODIUM 1 TABLET: 50; 8.6 TABLET ORAL at 21:19

## 2024-11-25 RX ADMIN — BACITRACIN 1 EACH: 500 OINTMENT TOPICAL at 13:58

## 2024-11-25 RX ADMIN — FAMOTIDINE 20 MG: 20 TABLET, FILM COATED ORAL at 21:19

## 2024-11-25 RX ADMIN — ACETAMINOPHEN 650 MG: 325 TABLET ORAL at 16:13

## 2024-11-25 ASSESSMENT — PAIN - FUNCTIONAL ASSESSMENT
PAIN_FUNCTIONAL_ASSESSMENT: PREVENTS OR INTERFERES SOME ACTIVE ACTIVITIES AND ADLS
PAIN_FUNCTIONAL_ASSESSMENT: ACTIVITIES ARE NOT PREVENTED

## 2024-11-25 ASSESSMENT — PAIN SCALES - GENERAL
PAINLEVEL_OUTOF10: 10
PAINLEVEL_OUTOF10: 9
PAINLEVEL_OUTOF10: 10
PAINLEVEL_OUTOF10: 5
PAINLEVEL_OUTOF10: 6

## 2024-11-25 ASSESSMENT — PAIN DESCRIPTION - DESCRIPTORS
DESCRIPTORS: ACHING
DESCRIPTORS: ACHING;SORE
DESCRIPTORS: ACHING
DESCRIPTORS: ACHING

## 2024-11-25 ASSESSMENT — PAIN DESCRIPTION - LOCATION
LOCATION: KNEE
LOCATION: KNEE
LOCATION: INCISION
LOCATION: KNEE;INCISION

## 2024-11-25 ASSESSMENT — PAIN DESCRIPTION - ORIENTATION
ORIENTATION: RIGHT
ORIENTATION: RIGHT
ORIENTATION: RIGHT;ANTERIOR

## 2024-11-25 ASSESSMENT — PAIN DESCRIPTION - ONSET: ONSET: GRADUAL

## 2024-11-25 ASSESSMENT — PAIN SCALES - WONG BAKER: WONGBAKER_NUMERICALRESPONSE: HURTS WORST

## 2024-11-25 ASSESSMENT — PAIN DESCRIPTION - PAIN TYPE
TYPE: SURGICAL PAIN
TYPE: SURGICAL PAIN

## 2024-11-25 ASSESSMENT — PAIN DESCRIPTION - FREQUENCY: FREQUENCY: INTERMITTENT

## 2024-11-26 VITALS
HEART RATE: 80 BPM | TEMPERATURE: 98 F | DIASTOLIC BLOOD PRESSURE: 85 MMHG | SYSTOLIC BLOOD PRESSURE: 124 MMHG | OXYGEN SATURATION: 99 % | RESPIRATION RATE: 16 BRPM

## 2024-11-26 LAB
INR PPP: 1.6 (ref 0.9–1.1)
PROTHROMBIN TIME: 16.3 SEC (ref 9–11.1)

## 2024-11-26 PROCEDURE — 2580000003 HC RX 258: Performed by: STUDENT IN AN ORGANIZED HEALTH CARE EDUCATION/TRAINING PROGRAM

## 2024-11-26 PROCEDURE — APPNB60 APP NON BILLABLE TIME 46-60 MINS: Performed by: NURSE PRACTITIONER

## 2024-11-26 PROCEDURE — 97116 GAIT TRAINING THERAPY: CPT

## 2024-11-26 PROCEDURE — 6370000000 HC RX 637 (ALT 250 FOR IP): Performed by: STUDENT IN AN ORGANIZED HEALTH CARE EDUCATION/TRAINING PROGRAM

## 2024-11-26 PROCEDURE — 6370000000 HC RX 637 (ALT 250 FOR IP): Performed by: NURSE PRACTITIONER

## 2024-11-26 PROCEDURE — 6370000000 HC RX 637 (ALT 250 FOR IP): Performed by: ORTHOPAEDIC SURGERY

## 2024-11-26 PROCEDURE — 85610 PROTHROMBIN TIME: CPT

## 2024-11-26 PROCEDURE — 97530 THERAPEUTIC ACTIVITIES: CPT

## 2024-11-26 RX ORDER — SENNA AND DOCUSATE SODIUM 50; 8.6 MG/1; MG/1
1 TABLET, FILM COATED ORAL 2 TIMES DAILY
Qty: 14 TABLET | Refills: 0 | Status: SHIPPED
Start: 2024-11-26 | End: 2024-12-03

## 2024-11-26 RX ORDER — OXYCODONE HYDROCHLORIDE 5 MG/1
5 TABLET ORAL EVERY 4 HOURS PRN
Qty: 30 TABLET | Refills: 0 | Status: SHIPPED | OUTPATIENT
Start: 2024-11-26 | End: 2024-12-01

## 2024-11-26 RX ORDER — WARFARIN SODIUM 5 MG/1
7.5 TABLET ORAL
Status: COMPLETED | OUTPATIENT
Start: 2024-11-26 | End: 2024-11-26

## 2024-11-26 RX ADMIN — SODIUM CHLORIDE, PRESERVATIVE FREE 10 ML: 5 INJECTION INTRAVENOUS at 08:20

## 2024-11-26 RX ADMIN — BACITRACIN: 500 OINTMENT TOPICAL at 08:19

## 2024-11-26 RX ADMIN — POLYETHYLENE GLYCOL 3350 17 G: 17 POWDER, FOR SOLUTION ORAL at 08:07

## 2024-11-26 RX ADMIN — ACETAMINOPHEN 650 MG: 325 TABLET ORAL at 08:07

## 2024-11-26 RX ADMIN — WARFARIN SODIUM 7.5 MG: 5 TABLET ORAL at 11:28

## 2024-11-26 RX ADMIN — FAMOTIDINE 20 MG: 20 TABLET, FILM COATED ORAL at 08:07

## 2024-11-26 RX ADMIN — OXYCODONE 5 MG: 5 TABLET ORAL at 04:57

## 2024-11-26 RX ADMIN — ACETAMINOPHEN 650 MG: 325 TABLET ORAL at 04:57

## 2024-11-26 RX ADMIN — OXYCODONE 5 MG: 5 TABLET ORAL at 13:15

## 2024-11-26 RX ADMIN — SENNOSIDES AND DOCUSATE SODIUM 1 TABLET: 50; 8.6 TABLET ORAL at 08:19

## 2024-11-26 ASSESSMENT — PAIN SCALES - GENERAL
PAINLEVEL_OUTOF10: 8
PAINLEVEL_OUTOF10: 3
PAINLEVEL_OUTOF10: 8
PAINLEVEL_OUTOF10: 8

## 2024-11-26 ASSESSMENT — PAIN DESCRIPTION - DESCRIPTORS: DESCRIPTORS: ACHING

## 2024-11-26 ASSESSMENT — PAIN DESCRIPTION - ORIENTATION: ORIENTATION: RIGHT

## 2024-11-26 NOTE — CARE COORDINATION
CM noted previous CM noted.  Referral sent via [facility] to [place] for SNF.  Patient needing 3 night qualifying stay, which is 11/25/24.  CM to continue to follow.        RAMIREZ Quiroz   Care Manager  Available via Loopback    
JAYDEN:     CM checked [facility] for response to referral for SNF at [facility]. Patient has been to [facility] in the past. No response yet from [facility] about acceptance.     CM sent message to yesterday's attending to check on medical readiness in the event that [facility] responds with bed availability.     CM checked in with Pt and provided update about [facility]. He states that while he would prefer to d/c home, he understands that going to rehab first is recommended and he is agreeable to it. CM called wife \"Ten\" at [phone] and provided her with an update about [facility] and discharge tomorrow or later.     Transition of Care Plan:    RUR: 7%  Prior Level of Functioning: home   Disposition: snf  JACQUI: 11/26  If SNF or IPR: Date FOC offered: 11/22  Date FOC received: 11/22  Accepting facility: pending in [facility]  Date authorization started with reference number:   Date authorization received and expires:   Follow up appointments: snf  DME needed: snf  Transportation at discharge:   IM/IMM Medicare/ letter given: y  Is patient a  and connected with VA?    If yes, was  transfer form completed and VA notified?   Caregiver Contact: [phone] wife Jodie  Discharge Caregiver contacted prior to discharge? y  Care Conference needed?   Barriers to discharge:  response from facility/SNF  
JAYDEN:     JAYDEN:    Anticipated d/c to [facility] (accepted) on Tue 11/26    From [facility] for tomorrow:  #[phone]   Report [phone]  CM to Set up transport anytime - they are expecting him.   CM to call Promise Stevenson @ [phone] any questions.   --    CM sent message to yesterday's attending to check on medical readiness in the event that [facility] responds with bed availability.     CM checked in with Pt and provided update about [facility]. He states that while he would prefer to d/c home, he understands that going to rehab first is recommended and he is agreeable to it. CM called wife \"Ten\" at [phone] and provided her with an update about [facility] and discharge tomorrow or later.     Transition of Care Plan:    RUR: 7%  Prior Level of Functioning: home   Disposition: snf  JACQUI: 11/26  If SNF or IPR: Date FOC offered: 11/22  Date FOC received: 11/22  Accepting facility: pending in [facility]  Date authorization started with reference number:   Date authorization received and expires:   Follow up appointments: snf  DME needed: snf  Transportation at discharge:   IM/IMM Medicare/ letter given: y  Is patient a [facility] and connected with VA?    If yes, was [facility] transfer form completed and VA notified?   Caregiver Contact: [phone] wife Jodie  Discharge Caregiver contacted prior to discharge? y  Care Conference needed?   Barriers to discharge:  response from facility/SNF  
JAYDEN:    CM notified that Pt will need SNF; he went to [facility] prior and states he would like to return there \"as long as I can work with [facility] again.\"     CM unable to send referral via [facility], but patient will need 3 night stay regardless (= 11/25 at the earliest)    ** Weekend CM please send referral to [facility] once [facility] is working again **    --  ([facility] for [facility] but has also been seen by [facility] prior, HH order not sent; SNF recommended)    After prior surgery, went to [facility] for SNF before home    Owns walker    CM met with Pt, confirmed face sheet and HH choices; OBS Pt  - changed to inpatient on 11/22 11/22/24 0979   Service Assessment   Patient Orientation Alert and Oriented   Cognition Alert   History Provided By Patient   Primary Caregiver Self   Support Systems Spouse/Significant Other   Patient's Healthcare Decision Maker is: Legal Next of Kin   Current Functional Level Assistance with the following:;Mobility   Can patient return to prior living arrangement Yes   Ability to make needs known: Good   Family able to assist with home care needs: Yes   Discharge Planning   Patient expects to be discharged to: House   Services At/After Discharge   Confirm Follow Up Transport Family   Condition of Participation: Discharge Planning   The Plan for Transition of Care is related to the following treatment goals: Home Health   The Patient and/or Patient Representative was provided with a Choice of Provider? Patient   The Patient and/Or Patient Representative agree with the Discharge Plan? Yes   Freedom of Choice list was provided with basic dialogue that supports the patient's individualized plan of care/goals, treatment preferences, and shares the quality data associated with the providers?  Yes           
  Treatment:  []Isolation (for MRSA, VRE, etc.)  []Surgical Drain Management  []Tracheostomy Care  []Dressing Changes  []Dialysis with transportation  []PEG Care  []Oxygen  []Daily Weights for Heart Failure    Dietary:  []Any diet limitations  []Tube Feedings   []Total Parenteral Management (TPN)    Financial Resources:  []Medicaid Application Completed    []UAI Completed and copy given to pt/family  and copy given to pt/family  []A screening has previously been completed.    []Level II Completed    [] Private pay individual who will not become   financially eligible for Medicaid within 6 months from admission to a [facility].     [] Individual refused to have screening conducted.     []Medicaid Application Completed    []The screening denied because it was determined individual did not need/did not qualify for nursing facility level of care.  [] Out of state residents seeking direct admission to a VA nursing facility.  [] Individuals who are inpatients of an out of state hospital, or in state or out of state veterans/ hospital and seek direct admission to a VA nursing facility  [] Individuals who are pateints or residents of a state owned/operated facility that is licensed by Department of Behavioral Services (DBHDS) and seek direct admission to VA nursing facility  [] A screening not required for enrollment in Medicaid Hospice services as set out in 12 VAC 30-  [] [facility] ([facility]) staff shall perform screenings of the [facility] clients.    Advanced Care Plan:  []Surrogate Decision Maker of Care  []POA  []Communicated Code Status and copy sent.    Other:          
English

## 2024-11-26 NOTE — DISCHARGE SUMMARY
Ortho Discharge Summary    Patient ID:  Chuy Krause  [id number]  male  73 y.o.  1950    Admit date: 11/21/2024    Discharge date: 11/26/2024    Admitting Physician: Ashley Hu MD     Consulting Physician(s):   Treatment Team:   Ashley Hu MD Wolfe, Shannon, MD McKinley, Lena Fraser, RN  Scarlett Morales, PT  Alejandrina Vang OT    Date of Surgery:   11/21/2024     Preoperative Diagnosis:  Osteoarthritis of right knee [M17.11]    Postoperative Diagnosis:   * No post-op diagnosis entered *    Procedure(s):   RIGHT TOTAL KNEE  ARTHROPLASTY     Anesthesia Type:   General     Surgeon: Ashley Hu MD                            HPI:  Pt is a 73 y.o. male who has a history of Osteoarthritis of right knee [M17.11]  with pain and limitations of activities of daily living who presents at this time for a RIGHT TOTAL KNEE  ARTHROPLASTY following the failure of conservative management.    PMH:   Past Medical History:   Diagnosis Date    Arthritis of low back     Basal cell carcinoma (BCC)     Osteoarthritis of both knees     Spinal stenosis of lumbar region        There is no height or weight on file to calculate BMI. : A BMI > 30 is classified as obesity and > 40 is classified as morbid obesity.     Medications upon admission :   Prior to Admission Medications   Prescriptions Last Dose Informant Patient Reported? Taking?   NAPROXEN PO Past Week  Yes No   Sig: Take 2 tablets by mouth every morning   NIACIN ER PO Past Week  Yes Yes   Sig: Take 1 tablet by mouth daily   Red Yeast Rice Extract (RED YEAST RICE PO) Past Week  Yes Yes   Sig: Take 1,000 mg by mouth daily   acetaminophen (TYLENOL) 325 MG tablet 11/21/2024 at 0530  No Yes   Sig: Take 2 tablets by mouth every 6 hours for 7 days   Patient taking differently: Take 2 tablets by mouth every 6 hours as needed for Pain   b complex vitamins capsule Past Week  Yes Yes   Sig: Take 1 capsule by mouth every morning   metFORMIN (GLUCOPHAGE) 500 MG tablet

## 2024-11-26 NOTE — PROGRESS NOTES
1046am- Patient is discharging to [facility]@ 1pm. Report called to Diana [phone] and she had no questions. IV DC'd. Hard script on the chart for pain meds.   
2120H Primary RN informed this RN about no Post Op orders in place for Pt. Notified On call provider on [employer], and on call center about Pt C/o 7/10 Pain on incision site. Awaiting orders.      
8:27 pm Secure message sent to on call MD Estuardo Ellison, as the patient was sent from PACU to room 577 post op Rt Total Knee at 7:15 pm today, but there are no orders to release on this patient, including any pain meds. Discussed with Lilibeth shafer RN, and will notify night nurse to follow up as no response as of 8:40pm.   
Occupational Therapy  11/22/24    Orders received, chart reviewed and patient evaluated by occupational therapy. Pending progression with skilled acute occupational therapy, recommend:    Moderate intensity short-term skilled occupational therapy up to 5x/week    Recommend with nursing patient to complete as able in order to maintain strength, endurance and independence: OOB to chair 3x/day, ADLs with assist and performing toileting christine wharton and BSC Ax2 assist. Thank you for your assistance.     Full evaluation to follow.   Alejandrina Vang, OT    
Ortho NP Note    POD# 5  s/p RIGHT TOTAL KNEE  ARTHROPLASTY   Pt seen with no visitor.     Pt resting in bed comfortably. Aox3 however states he does not remembering deferring therapy yesterday.   Currently reports minimal postop knee pain at rest, relieved by oxycodone   C/o appropriate postop knee pain, relieved by oxycodone   Slow progress with therapy thus far. He really wants to return home but understand he is not ready and needs rehab.     VSS Afebrile.    Visit Vitals  /85   Pulse 80   Temp 98 °F (36.7 °C) (Oral)   Resp 16   SpO2 99%       Voiding status: voiding          Labs    Lab Results   Component Value Date/Time    HGB 12.7 11/22/2024 05:06 AM      Lab Results   Component Value Date/Time    INR 1.6 11/26/2024 05:30 AM      Lab Results   Component Value Date/Time     11/22/2024 05:06 AM    K 4.2 11/22/2024 05:06 AM     11/22/2024 05:06 AM    CO2 22 11/22/2024 05:06 AM    BUN 18 11/22/2024 05:06 AM     Recent Glucose Results:   Glucose   Date Value Ref Range Status   11/22/2024 124 (H) 65 - 100 mg/dL Final   02/09/2024 117 (H) 65 - 100 mg/dL Final           There is no height or weight on file to calculate BMI. : A BMI > 30 is classified as obesity and > 40 is classified as morbid obesity.       Dressing c.d.i  Cryotherapy in place over incision  Calves soft and supple; No pain with passive stretch  Sensation and motor intact. +PF/DF/EHL intact   SCDs for mechanical DVT proph while in bed     PLAN:  1) PT BID, OT - [facility]AT  2) Coumadin for DVT Prophylaxis. INR 1.6 today - Pharmacy to dsoe. Encouraged early mobilization, bed exercises, and SCD use.  3) Pain control - scheduled tylenol, and prn oxycodone 5 mg. Limit narcotics as able.  4) Post op care - Continue bowel regimen, encouraged IS. Straight cath per protocol. Daily dry dressing changes .   5) Discharge planning - [facility] today by medical transport.       COLETTE Galicia - NP    
POD 1 Day Post-Op    Procedure:  Procedure(s) with comments:  RIGHT TOTAL KNEE  ARTHROPLASTY - RIGHT TOTAL KNEE ARTHROPLASTY    Subjective:     Patient doing well. Moderate pain    Objective:     Blood pressure 120/74, pulse 75, temperature 98.1 °F (36.7 °C), temperature source Oral, resp. rate 18, SpO2 92%.    Temp (24hrs), Av.8 °F (36.6 °C), Min:97 °F (36.1 °C), Max:98.6 °F (37 °C)      Physical Exam:  Examination of the right knee reveals that the incision is clean, dry and intact. Sensation is intact to light touch.  mild swelling.  no calf pain.  NVI    Labs:   Lab Results   Component Value Date/Time    HGB 12.7 2024 05:06 AM    INR 1.7 2024 09:13 AM         Assessment:     Principal Problem:    Osteoarthritis of right knee  Active Problems:    Primary osteoarthritis of right knee  Resolved Problems:    * No resolved hospital problems. *      Plan/Recommendations/Medical Decision Making:     Continue physical therapy  Ice and elevate  Continue coumadin for DVT prophylaxis  Probably home today            
POD 2 Days Post-Op    Procedure:  Procedure(s) with comments:  RIGHT TOTAL KNEE  ARTHROPLASTY - RIGHT TOTAL KNEE ARTHROPLASTY    Subjective:     Patient doing well. Moderate pain.  Progressing well with physical therapy.    Objective:     Blood pressure 114/69, pulse 72, temperature 98.4 °F (36.9 °C), temperature source Oral, resp. rate 15, SpO2 94%.    Temp (24hrs), Av.4 °F (36.9 °C), Min:98.1 °F (36.7 °C), Max:98.7 °F (37.1 °C)      Physical Exam:  Examination of the right knee reveals that the incision is clean, dry and intact. Sensation is intact to light touch.  mild swelling.  no calf pain.  NVI    Labs:   Lab Results   Component Value Date/Time    HGB 12.7 2024 05:06 AM    INR 1.0 2024 06:38 AM         Assessment:     Principal Problem:    Osteoarthritis of right knee  Active Problems:    Primary osteoarthritis of right knee    Osteoarthritis of right knee, unspecified osteoarthritis type  Resolved Problems:    * No resolved hospital problems. *      Plan/Recommendations/Medical Decision Making:     Continue physical therapy  Ice and elevate  Continue coumadin for DVT prophylaxis  Discharge home today after therapy.    --    Jay Waggoner, DO  Orthopaedic Surgery             
POD 3 Days Post-Op    Procedure:  Procedure(s) with comments:  RIGHT TOTAL KNEE  ARTHROPLASTY - RIGHT TOTAL KNEE ARTHROPLASTY    Subjective:     Patient doing well. Moderate pain.  Progressing well with physical therapy.    Objective:     Blood pressure 110/71, pulse 80, temperature 98.8 °F (37.1 °C), resp. rate 16, SpO2 96%.    Temp (24hrs), Av.2 °F (36.8 °C), Min:97.5 °F (36.4 °C), Max:98.8 °F (37.1 °C)      Physical Exam:  Examination of the right knee reveals that the incision is clean, dry and intact. Sensation is intact to light touch.  mild swelling.  no calf pain.  NVI    Labs:   Lab Results   Component Value Date/Time    HGB 12.7 2024 05:06 AM    INR 1.1 2024 05:41 AM         Assessment:     Principal Problem:    Osteoarthritis of right knee  Active Problems:    Primary osteoarthritis of right knee    Osteoarthritis of right knee, unspecified osteoarthritis type  Resolved Problems:    * No resolved hospital problems. *      Plan/Recommendations/Medical Decision Making:     Continue physical therapy  Ice and elevate  Continue coumadin for DVT prophylaxis  Discharge home today if pain under control    --    Jay Waggoner, DO  Orthopaedic Surgery             
Pharmacist Note - Warfarin Dosing  Consult provided for this 73 y.o. male to manage warfarin for VTE Ppx s/p RIGHT TOTAL KNEE ARTHROPLASTY     INR Goal: 1.7- 2.2  Therapy Day: 1  Preop Dose: Hu- none    Drugs that may increase INR: None  Drugs that may decrease INR: None  Other current anticoagulants/ drugs that may increase bleeding risk: NSAID  Risk factors: Age > 65  Daily INR ordered: YES    Recent Labs     11/22/24  0506   HGB 12.7     Date               INR                 Dose  11/08  1.0  --  11/22  --  7.5 mg       Assessment/ Plan:  Will order warfarin 7.5 mg PO x 1 dose.    Pharmacy will continue to monitor daily and adjust therapy as indicated.     Marquise Hernandez, PharmD  Clinical Pharmacist, Orthopedics and Med/Surg  [facility] ([phone])    
Pharmacist Note - Warfarin Dosing  Consult provided for this 73 y.o. male to manage warfarin for VTE Ppx s/p RIGHT TOTAL KNEE ARTHROPLASTY     INR Goal: 1.7- 2.2  Therapy Day: 2  Preop Dose: Hu- none    Drugs that may increase INR: None  Drugs that may decrease INR: None  Other current anticoagulants/ drugs that may increase bleeding risk: NSAID  Risk factors: Age > 65  Daily INR ordered: YES    Recent Labs     11/22/24  0506 11/23/24  0638   HGB 12.7  --    INR  --  1.0     Date               INR                 Dose  11/08  1  --  11/22  --  7.5 mg   11/23               1                     5 mg    Assessment/ Plan:  Will order warfarin 5 mg PO x 1 dose.    Pharmacy will continue to monitor daily and adjust therapy as indicated.         
Pharmacist Note - Warfarin Dosing  Consult provided for this 73 y.o. male to manage warfarin for VTE Ppx s/p RIGHT TOTAL KNEE ARTHROPLASTY     INR Goal: 1.7- 2.2  Therapy Day: 3  Preop Dose: Hu- none    Drugs that may increase INR: None  Drugs that may decrease INR: None  Other current anticoagulants/ drugs that may increase bleeding risk: NSAID  Risk factors: Age > 65  Daily INR ordered: YES    Recent Labs     11/22/24  0506 11/23/24  0638 11/24/24  0541   HGB 12.7  --   --    INR  --  1.0 1.1     Date               INR                 Dose  11/08  1  --  11/22  --  7.5 mg   11/23               1                      5 mg  11/24               1.1                   7.5 mg    Assessment/ Plan:  Will order warfarin 7.5 mg PO x 1 dose.    Pharmacy will continue to monitor daily and adjust therapy as indicated.           
Pharmacist Note - Warfarin Dosing  Consult provided for this 73 y.o. male to manage warfarin for VTE Ppx s/p RIGHT TOTAL KNEE ARTHROPLASTY     INR Goal: 1.7- 2.2  Therapy Day: 3  Preop Dose: Hu- none    Drugs that may increase INR: None  Drugs that may decrease INR: None  Other current anticoagulants/ drugs that may increase bleeding risk: NSAID  Risk factors: Age > 65  Daily INR ordered: YES    Recent Labs     11/23/24  0638 11/24/24  0541 11/25/24  0442   INR 1.0 1.1 1.4*     Date               INR                 Dose  11/08  1  --  11/22  --  7.5 mg   11/23               1                      5 mg  11/24               1.1                   7.5 mg  11/25  1.4  7.5 mg      Assessment/ Plan:  Will order warfarin 7.5 mg PO x 1 dose.    Pharmacy will continue to monitor daily and adjust therapy as indicated.             
Pharmacist Note - Warfarin Dosing  Consult provided for this 73 y.o. male to manage warfarin for VTE Ppx s/p RIGHT TOTAL KNEE ARTHROPLASTY     INR Goal: 1.7- 2.2  Therapy Day: 3  Preop Dose: Hu- none    Drugs that may increase INR: None  Drugs that may decrease INR: None  Other current anticoagulants/ drugs that may increase bleeding risk: NSAID  Risk factors: Age > 65  Daily INR ordered: YES    Recent Labs     11/24/24  0541 11/25/24  0442 11/26/24  0530   INR 1.1 1.4* 1.6*     Date               INR                 Dose  11/08  1  --  11/22  --  7.5 mg   11/23               1                      5 mg  11/24               1.1                   7.5 mg  11/25  1.4  7.5 mg  11/26  1.6  7.5 mg      Assessment/ Plan:  Will order warfarin 7.5 mg PO x 1 dose.    Pharmacy will continue to monitor daily and adjust therapy as indicated.               
Physical Therapy 11/24/2024         Chart reviewed. Attempted pm treatment but patient had recently returned to bed with nursing and requesting deferral secondary to fatigue.   Will continue to follow    Kevin Graff, PT     
Pt drowsy, rousable, requesting defer at this time.will return as able    Nanda Escobedo, DPT, PT    
Spiritual Health History and Assessment/Progress Note  [facility]    Initial Encounter,  ,  ,      Name: Chuy Krause MRN: [id number]    Age: 73 y.o.     Sex: male   Language: English   Buddhist: Confucianism   Osteoarthritis of right knee     Date: 11/25/2024            Total Time Calculated: 24 min              Spiritual Assessment began in [facility]S1 ORTHO [facility]        Referral/Consult From: Rounding   Encounter Overview/Reason: Initial Encounter  Service Provided For: Patient    Jacinda, Belief, Meaning:   Patient has beliefs or practices that help with coping during difficult times  Family/Friends No family/friends present      Importance and Influence:  Patient has spiritual/personal beliefs that influence decisions regarding their health  Family/Friends No family/friends present    Community:  Patient feels well-supported. Support system includes: Spouse/Partner and Extended family  Family/Friends No family/friends present    Assessment and Plan of Care:     Patient Interventions include: Facilitated expression of thoughts and feelings and Affirmed coping skills/support systems  Family/Friends Interventions include: No family/friends present    Patient Plan of Care: Spiritual Care available upon further referral  Family/Friends Plan of Care: No family/friends present    Electronically signed by Inder Everett, Chaplain Resident, M.Div., on 11/25/2024 at 2:23 PM   
IS. Straight cath per protocol. Daily dry dressing changes .   5) Discharge planning - pending progress with therapy; Home vs SNF.       COLETTE Galicia - NP

## 2024-11-27 NOTE — PLAN OF CARE
Problem: Pain  Goal: Verbalizes/displays adequate comfort level or baseline comfort level  Outcome: Progressing  Flowsheets (Taken 11/24/2024 8079)  Verbalizes/displays adequate comfort level or baseline comfort level:   Encourage patient to monitor pain and request assistance   Administer analgesics based on type and severity of pain and evaluate response   Assess pain using appropriate pain scale     Problem: Safety - Adult  Goal: Free from fall injury  Outcome: Progressing     
  Problem: Physical Therapy - Adult  Goal: By Discharge: Performs mobility at highest level of function for planned discharge setting.  See evaluation for individualized goals.  Description: FUNCTIONAL STATUS PRIOR TO ADMISSION: Patient was modified independent using a rolling walker and single point cane for functional mobility.    HOME SUPPORT PRIOR TO ADMISSION: The patient lived with wife but did not require assistance.    Physical Therapy Goals  Initiated 11/22/2024  1.  Patient will move from supine to sit and sit to supine, scoot up and down, and roll side to side in bed with contact guard assist within 4 day(s).    2.  Patient will perform sit to stand with contact guard assist within 4 day(s).  3.  Patient will transfer from bed to chair and chair to bed with contact guard assist using the least restrictive device within 4 day(s).  4.  Patient will ambulate with contact guard assist for 10 feet with the least restrictive device within 4 day(s).   5.  Patient will ascend/descend 4 stairs with handrail(s) with minimal assistance within 4 day(s).  6. Patient will perform home exercise program per protocol with supervision/set-up within 4 days.  7. Patient will demonstrate AROM 0-90 degrees in operative joint within 4 days.    Outcome: Progressing       PHYSICAL THERAPY TREATMENT    Patient: Chuy Krause (73 y.o. male)  Date: 11/23/2024  Diagnosis: Osteoarthritis of right knee [M17.11]  Primary osteoarthritis of right knee [M17.11]  Osteoarthritis of right knee, unspecified osteoarthritis type [M17.11] Osteoarthritis of right knee  Procedure(s) (LRB):  RIGHT TOTAL KNEE  ARTHROPLASTY (Right) 2 Days Post-Op  Precautions: Weight Bearing Right Lower Extremity Weight Bearing: Weight Bearing As Tolerated                    ASSESSMENT:  Patient continues to benefit from skilled PT services and is slowly progressing towards goals. Pt generally mobilized at a Min A x2 to Min A level during today's session. Pt 
  Problem: Physical Therapy - Adult  Goal: By Discharge: Performs mobility at highest level of function for planned discharge setting.  See evaluation for individualized goals.  Description: FUNCTIONAL STATUS PRIOR TO ADMISSION: Patient was modified independent using a rolling walker and single point cane for functional mobility.    HOME SUPPORT PRIOR TO ADMISSION: The patient lived with wife but did not require assistance.    Physical Therapy Goals  Initiated 11/22/2024  1.  Patient will move from supine to sit and sit to supine, scoot up and down, and roll side to side in bed with contact guard assist within 4 day(s).    2.  Patient will perform sit to stand with contact guard assist within 4 day(s).  3.  Patient will transfer from bed to chair and chair to bed with contact guard assist using the least restrictive device within 4 day(s).  4.  Patient will ambulate with contact guard assist for 10 feet with the least restrictive device within 4 day(s).   5.  Patient will ascend/descend 4 stairs with handrail(s) with minimal assistance within 4 day(s).  6. Patient will perform home exercise program per protocol with supervision/set-up within 4 days.  7. Patient will demonstrate AROM 0-90 degrees in operative joint within 4 days.    Outcome: Progressing   PHYSICAL THERAPY TREATMENT    Patient: Chuy Krause (73 y.o. male)  Date: 11/24/2024  Diagnosis: Osteoarthritis of right knee [M17.11]  Primary osteoarthritis of right knee [M17.11]  Osteoarthritis of right knee, unspecified osteoarthritis type [M17.11] Osteoarthritis of right knee  Procedure(s) (LRB):  RIGHT TOTAL KNEE  ARTHROPLASTY (Right) 3 Days Post-Op  Precautions: Weight Bearing Right Lower Extremity Weight Bearing: Weight Bearing As Tolerated                    ASSESSMENT:  Patient continues to benefit from skilled PT services and is slowly progressing towards goals. Patient seen for ongoing PT intervention with continued limited tolerance to progression 
  Problem: Safety - Adult  Goal: Free from fall injury  11/24/2024 2300 by Mehnaz Marte LPN  Outcome: Progressing  11/24/2024 0939 by Lena Jurado RN  Outcome: Progressing     Problem: Pain  Goal: Verbalizes/displays adequate comfort level or baseline comfort level  11/24/2024 2300 by Mehnaz Marte LPN  Outcome: Progressing  11/24/2024 0939 by Lena Jurado RN  Outcome: Progressing  Flowsheets (Taken 11/24/2024 0939)  Verbalizes/displays adequate comfort level or baseline comfort level:   Encourage patient to monitor pain and request assistance   Administer analgesics based on type and severity of pain and evaluate response   Assess pain using appropriate pain scale     Problem: Discharge Planning  Goal: Discharge to home or other facility with appropriate resources  Outcome: Progressing  Flowsheets (Taken 11/24/2024 2034)  Discharge to home or other facility with appropriate resources: Identify barriers to discharge with patient and caregiver     Problem: Physical Therapy - Adult  Goal: By Discharge: Performs mobility at highest level of function for planned discharge setting.  See evaluation for individualized goals.  Description: FUNCTIONAL STATUS PRIOR TO ADMISSION: Patient was modified independent using a rolling walker and single point cane for functional mobility.    HOME SUPPORT PRIOR TO ADMISSION: The patient lived with wife but did not require assistance.    Physical Therapy Goals  Initiated 11/22/2024  1.  Patient will move from supine to sit and sit to supine, scoot up and down, and roll side to side in bed with contact guard assist within 4 day(s).    2.  Patient will perform sit to stand with contact guard assist within 4 day(s).  3.  Patient will transfer from bed to chair and chair to bed with contact guard assist using the least restrictive device within 4 day(s).  4.  Patient will ambulate with contact guard assist for 10 feet with the least restrictive device within 4 day(s).   5.  Patient 
  Problem: Safety - Adult  Goal: Free from fall injury  11/25/2024 2310 by Judy Velazquez LPN  Outcome: Progressing  11/25/2024 1201 by Quinton Rossi RN  Outcome: Progressing     Problem: Pain  Goal: Verbalizes/displays adequate comfort level or baseline comfort level  11/25/2024 2310 by Judy Velazquez LPN  Outcome: Progressing  11/25/2024 1201 by Quinton Rossi RN  Outcome: Progressing     Problem: Discharge Planning  Goal: Discharge to home or other facility with appropriate resources  11/25/2024 2310 by Judy Velazquez LPN  Outcome: Progressing  11/25/2024 1201 by Quinton Rossi RN  Outcome: Progressing     Problem: Physical Therapy - Adult  Goal: By Discharge: Performs mobility at highest level of function for planned discharge setting.  See evaluation for individualized goals.  Description: FUNCTIONAL STATUS PRIOR TO ADMISSION: Patient was modified independent using a rolling walker and single point cane for functional mobility.    HOME SUPPORT PRIOR TO ADMISSION: The patient lived with wife but did not require assistance.    Physical Therapy Goals  Initiated 11/22/2024  1.  Patient will move from supine to sit and sit to supine, scoot up and down, and roll side to side in bed with contact guard assist within 4 day(s).    2.  Patient will perform sit to stand with contact guard assist within 4 day(s).  3.  Patient will transfer from bed to chair and chair to bed with contact guard assist using the least restrictive device within 4 day(s).  4.  Patient will ambulate with contact guard assist for 10 feet with the least restrictive device within 4 day(s).   5.  Patient will ascend/descend 4 stairs with handrail(s) with minimal assistance within 4 day(s).  6. Patient will perform home exercise program per protocol with supervision/set-up within 4 days.  7. Patient will demonstrate AROM 0-90 degrees in operative joint within 4 days.    11/25/2024 0950 by Nanda Escobedo, PT  Outcome: Progressing     Problem: 
  Problem: Safety - Adult  Goal: Free from fall injury  Outcome: Adequate for Discharge     Problem: Pain  Goal: Verbalizes/displays adequate comfort level or baseline comfort level  Outcome: Adequate for Discharge     Problem: Discharge Planning  Goal: Discharge to home or other facility with appropriate resources  Outcome: Adequate for Discharge  Flowsheets (Taken 11/26/2024 0819)  Discharge to home or other facility with appropriate resources: Arrange for needed discharge resources and transportation as appropriate     Problem: Skin/Tissue Integrity  Goal: Absence of new skin breakdown  Description: 1.  Monitor for areas of redness and/or skin breakdown  2.  Assess vascular access sites hourly  3.  Every 4-6 hours minimum:  Change oxygen saturation probe site  4.  Every 4-6 hours:  If on nasal continuous positive airway pressure, respiratory therapy assess nares and determine need for appliance change or resting period.  Outcome: Adequate for Discharge     Problem: Respiratory - Adult  Goal: Achieves optimal ventilation and oxygenation  Outcome: Adequate for Discharge  Flowsheets (Taken 11/26/2024 0819)  Achieves optimal ventilation and oxygenation:   Assess for changes in respiratory status   Assess for changes in mentation and behavior   Position to facilitate oxygenation and minimize respiratory effort     Problem: Skin/Tissue Integrity - Adult  Goal: Skin integrity remains intact  Outcome: Adequate for Discharge  Goal: Incisions, wounds, or drain sites healing without S/S of infection  Outcome: Adequate for Discharge     
  Problem: Safety - Adult  Goal: Free from fall injury  Outcome: Progressing  Flowsheets (Taken 11/23/2024 0850)  Free From Fall Injury: Instruct family/caregiver on patient safety     Problem: Respiratory - Adult  Goal: Achieves optimal ventilation and oxygenation  Outcome: Progressing     Problem: Pain  Goal: Verbalizes/displays adequate comfort level or baseline comfort level  Outcome: Progressing     Problem: Skin/Tissue Integrity - Adult  Goal: Skin integrity remains intact  Outcome: Progressing  Flowsheets (Taken 11/23/2024 0850)  Skin Integrity Remains Intact:   Monitor for areas of redness and/or skin breakdown   Assess vascular access sites hourly     
Fair (occasional)  Sitting - Dynamic: Not tested  Standing: Impaired;With support  Standing - Static: Constant support;Fair  Standing - Dynamic: Constant support;Fair   Ambulation/Gait Training:     Gait  Gait Training: Yes  Left Side Weight Bearing: As tolerated  Overall Level of Assistance: Minimum assistance;Moderate assistance;Assist X1  Distance (ft): 15 Feet (x 2 - to/from bathroom)  Assistive Device: Gait belt;Walker, rolling  Interventions: Verbal cues;Safety awareness training;Weight shifting training/pressure relief  Base of Support: Center of gravity altered;Shift to left  Speed/Vianca: Slow  Step Length: Left shortened  Swing Pattern: Right asymmetrical  Stance: Right decreased  Gait Abnormalities: Antalgic;Decreased step clearance;Step to gait          Pain Rating:  Denies pain at rest; reports significant pain during weight bearing.    Pain Intervention(s):   patient medicated for pain prior to session, ice, rest, and repositioning    Activity Tolerance:   Fair  and requires frequent rest breaks, limited by pain    After treatment:   Patient left in no apparent distress sitting up in chair, Call bell within reach, and Bed/ chair alarm activated      COMMUNICATION/EDUCATION:   The patient's plan of care was discussed with: registered nurse    Patient Education  Education Given To: Patient  Education Provided: Transfer Training  Education Provided Comments: Gait training to increase weight shift to right and increase stride left.  Education Method: Verbal  Barriers to Learning: Cognition  Education Outcome: Continued education needed;Verbalized understanding      Scarlett Morales PT  Minutes: 30   
Pivot Transfers: Moderate assistance;Assist X2;Minimum assistance  Balance:  Balance  Sitting: Impaired  Sitting - Static: Fair (occasional)  Sitting - Dynamic: Poor (constant support);Fair (occasional);Supported sitting  Standing: Impaired  Standing - Static: Constant support;Fair  Standing - Dynamic: Fair;Constant support   Ambulation/Gait Training:     Gait  Gait Training: Yes  Left Side Weight Bearing: As tolerated  Right Side Weight Bearing: As tolerated  Overall Level of Assistance: Moderate assistance;Assist X1;Minimum assistance  Distance (ft): 15 Feet (15+15)  Assistive Device: Gait belt;Walker, rolling  Interventions: Verbal cues;Weight shifting training/pressure relief;Safety awareness training;Tactile cues;Manual cues  Base of Support: Widened  Speed/Vianca: Slow;Shuffled  Step Length: Right shortened;Left shortened  Stance: Right decreased  Gait Abnormalities: Antalgic;Decreased step clearance;Shuffling gait;Step to gait (poor advancement of BLE, keeps R foot ahead of left >6 inches, max cues to achieve near-step-to)        Neuro Re-Education:                    Pain Rating: 10/10 reported, Pt pain appears stable with use of FLACC objective measurement pain scale:  Face 1, Legs 1, Activity 1, Cry 1, Consolability 1 for a score of 5/10    Face  0 No particular expression or smile 1 Occasional grimace or frown, withdrawn, uninterested 2 Frequent to constant quivering chin, clenched jaw  Legs  0 Normal position or relaxed  1 Uneasy, restless, tense      2 Kicking, or legs drawn up  Activity 0 Lying quietly/normally, moves easily 1 Squirming, shifting, back and forth, tense   2 Arched, rigid or jerking  Cry  0 No cry (awake or asleep)   1 Moans or whimpers; occasional complaint    2 Crying steadily, screams or sobs, frequent complaints  Consolability  0 Content, relaxed    1 Reassured by touching, being talked to, distractible  2 Difficult to console or comfort      Pain Intervention(s):   nursing 
equipment  Balance:  Balance  Sitting: Intact  Sitting - Static: Fair (occasional)  Sitting - Dynamic: Fair (occasional)  Standing: Impaired  Standing - Static: Constant support;Fair  Standing - Dynamic: Fair;Constant support   Ambulation/Gait Training:     Gait  Gait Training: Yes  Right Side Weight Bearing: As tolerated  Overall Level of Assistance: Contact-guard assistance;Additional time;Adaptive equipment  Distance (ft): 10 Feet (x2 with seated rest break and chair follow)  Assistive Device: Gait belt;Walker, rolling  Base of Support: Shift to left;Center of gravity altered  Speed/Vianca: Pace decreased (< 100 feet/min);Slow;Shuffled  Step Length: Right shortened;Left shortened  Swing Pattern: Right asymmetrical  Stance: Right decreased  Gait Abnormalities: Antalgic;Decreased step clearance;Shuffling gait;Step to gait (R LE with tendency for ER)       Pain Rating:  Pt did not quantify pain during session    Pain Intervention(s):   patient medicated for pain prior to session, ice, repositioning, and pain is at a level acceptable to the patient    Activity Tolerance:   Fair , requires rest breaks, and SpO2 stable on room air    After treatment:   Patient left in no apparent distress sitting up in chair, Call bell within reach, Bed/ chair alarm activated, and Caregiver / family present      COMMUNICATION/EDUCATION:   The patient's plan of care was discussed with: registered nurse and rehabilitation technician    Patient Education  Education Given To: Patient;Family  Education Provided: Plan of Care;Role of Therapy;Home Exercise Program;Precautions;Equipment;Transfer Training;Energy Conservation;Fall Prevention Strategies  Education Method: Verbal;Demonstration  Barriers to Learning: None  Education Outcome: Verbalized understanding;Continued education needed      Mack Raphael PT  Minutes: 24    
progression of gait with existing device    Recommendation for discharge: (in order for the patient to meet his/her long term goals):   Moderate intensity short-term skilled physical therapy up to 5x/week    Other factors to consider for discharge: patient's current support system is unable to meet their requirements for physical assistance, poor safety awareness, high risk for falls, and concern for safely navigating or managing the home environment    IF patient discharges home will need the following DME: continuing to assess with progress                SUBJECTIVE:   Patient stated “I have three grandkids.”    OBJECTIVE DATA SUMMARY:       Past Medical History:   Diagnosis Date    Arthritis of low back     Basal cell carcinoma (BCC)     Osteoarthritis of both knees     Spinal stenosis of lumbar region      Past Surgical History:   Procedure Laterality Date    APPENDECTOMY      COLONOSCOPY N/A 2/19/2018    COLONOSCOPY performed by Miguelito Lunsford MD at [facility] ENDOSCOPY    COLONOSCOPY N/A 09/26/2023    COLONOSCOPY performed by Miguelito Lunsford MD at [facility] ENDOSCOPY    KYPHOSIS SURGERY      SKIN BIOPSY      LT ARM    TONSILLECTOMY      TOTAL KNEE ARTHROPLASTY Left 02/08/2024    LEFT TOTAL KNEE ARTHROPLASTY (GEN/BLOCK) performed by Ashley Hu MD at [facility] MAIN OR    TOTAL KNEE ARTHROPLASTY Right 11/21/2024    RIGHT TOTAL KNEE  ARTHROPLASTY performed by Ashley Hu MD at [facility] MAIN OR       Home Situation:  Social/Functional History  Lives With: Spouse  Type of Home: House  Home Layout: Two level, Able to Live on Main level with bedroom/bathroom  Home Access: Stairs to enter with rails  Entrance Stairs - Number of Steps: 3-4  Entrance Stairs - Rails: Both  Bathroom Shower/Tub: Walk-in shower  Bathroom Toilet: Standard  Bathroom Equipment: Tub transfer bench, Grab bars in shower  Home Equipment: Walker - Rolling, Cane  Has the patient had two or more falls in the past year or any fall with injury in the past 
feet/min);Slow;Shuffled  Step Length: Right shortened;Left shortened  Swing Pattern: Right asymmetrical  Stance: Right decreased  Gait Abnormalities: Antalgic;Decreased step clearance;Shuffling gait          Pain Rating:  Did not interfere    Activity Tolerance:   Fair  and requires rest breaks    After treatment:   Patient left in no apparent distress in bed, Call bell within reach, Bed/ chair alarm activated, and Side rails x3      COMMUNICATION/EDUCATION:   The patient's plan of care was discussed with: occupational therapist and registered nurse    Patient Education  Education Given To: Patient  Education Provided: Plan of Care;Role of Therapy;Home Exercise Program;Precautions;Equipment;Transfer Training;Energy Conservation;Fall Prevention Strategies  Education Method: Verbal  Barriers to Learning: None (cognition: drowsy)  Education Outcome: Continued education needed      BESS Hernandez  Minutes: 29  Regarding student involvement in patient care:  A student participated in this treatment session. Per CMS Medicare statements and APTA guidelines I certify that the following was true:  1. I was present and directly observed the entire session.  2. I made all skilled judgments and clinical decisions regarding care.  3. I am the practitioner responsible for assessment, treatment, and documentation.    
Oriana, Jossie Flores, Dillon Pelayo, Jeri Friedman, George Herrera, Ronald Gastelum, AM-PAC “6-Clicks” Functional Assessment Scores Predict Acute Care Hospital Discharge Destination, Physical Therapy, Volume 94, Issue 9, 1 September 2014, Pages 3754-3416, [email or web address]    Pain Rating:  3/10 R knee  Pain Intervention(s):   ice and repositioning    Activity Tolerance:   Poor    After treatment:   Patient left in no apparent distress sitting up in chair, Call bell within reach, and Heels elevated for pressure relief    COMMUNICATION/EDUCATION:   The patient's plan of care was discussed with: physical therapist and registered nurse    Patient Education  Education Given To: Patient  Education Provided: Role of Therapy;Plan of Care;Precautions;ADL Adaptive Strategies;Equipment  Education Method: Verbal;Demonstration  Barriers to Learning:  (level of alertness)  Education Outcome: Unable to demonstrate understanding;Continued education needed    Thank you for this referral.  Alejandrina Vang OT  Minutes: 16    Occupational Therapy Evaluation Charge Determination   History Examination Decision-Making   LOW Complexity : Brief history review  MEDIUM Complexity: 3-5 Performance deficits relating to physical, cognitive, or psychosocial skills that result in activity limitations and/or participation restrictions MEDIUM Complexity: Patient may present with comorbidities that affect occupational performance. Minimal to moderate modifications of tasks or assist (eg. physical or verbal) with assist is necessary to enable pt to complete eval   Based on the above components, the patient evaluation is determined to be of the following complexity level: Low

## (undated) DEVICE — STRYKER PERFORMANCE SERIES SAGITTAL BLADE: Brand: STRYKER PERFORMANCE SERIES

## (undated) DEVICE — GARMENT,MEDLINE,DVT,INT,CALF,MED, GEN2: Brand: MEDLINE

## (undated) DEVICE — PENCIL SMK EVAC ALL IN 1 DSGN ENH VISIBILITY IMPROVED AIR

## (undated) DEVICE — HANDPIECE SET WITH BONE CLEANING TIP AND SUCTION TUBE: Brand: INTERPULSE

## (undated) DEVICE — PADDING CAST W6INXL4YD NONSTERILE COT RAYON MICROPLEATED

## (undated) DEVICE — INTENT OT USE PROVIDES A STERILE INTERFACE BETWEEN THE OPERATING ROOM SURGICAL LAMPS (NON-STERILE) AND THE SURGEON OR STAFF WORKING IN THE STERILE FIELD.: Brand: ASPEN® ALC PLUS LIGHT HANDLE COVER

## (undated) DEVICE — BAG SPEC BIOHZD LF 2MIL 6X10IN -- CONVERT TO ITEM 357326

## (undated) DEVICE — 450 ML BOTTLE OF 0.05% CHLORHEXIDINE GLUCONATE IN 99.95% STERILE WATER FOR IRRIGATION, USP AND APPLICATOR.: Brand: IRRISEPT ANTIMICROBIAL WOUND LAVAGE

## (undated) DEVICE — NEEDLE SPNL L3.5IN PNK HUB S STL REG WALL FIT STYL W/ QNCKE

## (undated) DEVICE — PIN EXT FIX SCHNZ 3 MM

## (undated) DEVICE — CONNECTOR TBNG AUX H2O JET DISP FOR OLY 160/180 SER

## (undated) DEVICE — DRAPE,EXTREMITY,89X128,STERILE: Brand: MEDLINE

## (undated) DEVICE — CANN NASAL O2 CAPNOGRAPHY AD -- FILTERLINE

## (undated) DEVICE — SOLUTION SURG PREP 26 CC PURPREP

## (undated) DEVICE — MARKER,SKIN,WI/RULER AND LABELS: Brand: MEDLINE

## (undated) DEVICE — COVER,MAYO STAND,STERILE: Brand: MEDLINE

## (undated) DEVICE — CONTAINER,SPECIMEN,3OZ,OR STRL: Brand: MEDLINE

## (undated) DEVICE — CATH IV AUTOGRD BC BLU 22GA 25 -- INSYTE

## (undated) DEVICE — ENDO CARRY-ON PROCEDURE KIT INCLUDES ENZYMATIC SPONGE, GAUZE, BIOHAZARD LABEL, TRAY, LUBRICANT, DIRTY SCOPE LABEL, WATER LABEL, TRAY, DRAWSTRING PAD, AND DEFENDO 4-PIECE KIT.: Brand: ENDO CARRY-ON PROCEDURE KIT

## (undated) DEVICE — SUTURE VCRL SZ 2 L54IN ABSRB UD L65MM TP-1 1/2 CIR J880T

## (undated) DEVICE — SPONGE GZ W4XL4IN COT 12 PLY TYP VII WVN C FLD DSGN STERILE

## (undated) DEVICE — KENDALL RADIOLUCENT FOAM MONITORING ELECTRODE -RECTANGULAR SHAPE: Brand: KENDALL

## (undated) DEVICE — CONTAINER SPEC 20 ML LID NEUT BUFF FORMALIN 10 % POLYPR STS

## (undated) DEVICE — AIRLIFE™ U/CONNECT-IT OXYGEN TUBING 7 FEET (2.1 M) CRUSH-RESISTANT OXYGEN TUBING, VINYL TIPPED: Brand: AIRLIFE™

## (undated) DEVICE — RECIPROCATING BLADE HEAVY DUTY LONG, OFFSET  (77.6 X 0.77 X 11.2MM)

## (undated) DEVICE — GLOVE ORANGE PI 8 1/2   MSG9085

## (undated) DEVICE — Z DISCONTINUED USE 2751540 TUBING IRRIG L10IN DISP PMP ENDOGATOR

## (undated) DEVICE — SOLUTION IRRIG 1000ML STRL H2O USP PLAS POUR BTL

## (undated) DEVICE — STRIP,CLOSURE,WOUND,MEDI-STRIP,1/2X4: Brand: MEDLINE

## (undated) DEVICE — SUTURE STRATAFIX SYMMETRIC PDS + SZ 1 L18IN ABSRB VLT L48MM SXPP1A400

## (undated) DEVICE — SUTURE VICRYL ABSORBABLE BRAIDED 2-0 CT 36 IN DA UD  VCP957H

## (undated) DEVICE — Z DISCONTINUED NO SUB IDED SET EXTN W/ 4 W STPCOCK M SPIN LOK 36IN

## (undated) DEVICE — YANKAUER,FLEXIBLE HANDLE,REGLR CAPACITY: Brand: MEDLINE INDUSTRIES, INC.

## (undated) DEVICE — HYPODERMIC SAFETY NEEDLE: Brand: MAGELLAN

## (undated) DEVICE — 3M™ STERI-DRAPE™ U-DRAPE 1015: Brand: STERI-DRAPE™

## (undated) DEVICE — TUBING SUCT 12FR MAL ALUM SHFT FN CAP VENT UNIV CONN W/ OBT

## (undated) DEVICE — PACK SURG PROC KNEE USER GPS

## (undated) DEVICE — 1200 GUARD II KIT W/5MM TUBE W/O VAC TUBE: Brand: GUARDIAN

## (undated) DEVICE — SCRUBIN SCRUB BRUSH DRY STER: Brand: MEDLINE INDUSTRIES, INC.

## (undated) DEVICE — TOTAL JOINT - SMH: Brand: MEDLINE INDUSTRIES, INC.

## (undated) DEVICE — ELECTRODE PT RET AD L9FT HI MOIST COND ADH HYDRGEL CORDED

## (undated) DEVICE — CUSTOM CAST PD STR

## (undated) DEVICE — REM POLYHESIVE ADULT PATIENT RETURN ELECTRODE: Brand: VALLEYLAB

## (undated) DEVICE — Z CONVERTED USE 2274299 CUFF BLD PRESSURE LNG MED AD 25-35 CM ARM FLEXIPORT DISP

## (undated) DEVICE — GOWN,NON-REINFORCED,XXL: Brand: MEDLINE

## (undated) DEVICE — SUTURE MCRYL SZ 4-0 L27IN ABSRB UD L24MM PS-1 3/8 CIR PRIM Y935H

## (undated) DEVICE — BANDAGE COMPR M W6INXL10YD WHT BGE VELC E MTRX HK AND LOOP

## (undated) DEVICE — PAD,ABDOMINAL,5"X9",ST,LF,25/BX: Brand: MEDLINE INDUSTRIES, INC.

## (undated) DEVICE — SOLIDIFIER FLUID 3000 CC ABSORB

## (undated) DEVICE — SUTURE VICRYL COATED ABSORBABLE BRAIDED 2-0 TP1 54 IN COAT UD VICRYL + VCP880T

## (undated) DEVICE — ZIMMER® STERILE DISPOSABLE TOURNIQUET CUFF WITH PLC, DUAL PORT, SINGLE BLADDER, 34 IN. (86 CM)

## (undated) DEVICE — SUTURE MCRYL + SZ 4-0 L27IN ABSRB UD PS1 L24MM 3/8 CIR REV MCP935H

## (undated) DEVICE — SOLUTION IRRIG 3000ML 0.9% SOD CHL USP UROMATIC PLAS CONT

## (undated) DEVICE — BAG BELONG PT PERS CLEAR HANDL

## (undated) DEVICE — BW-412T DISP COMBO CLEANING BRUSH: Brand: SINGLE USE COMBINATION CLEANING BRUSH

## (undated) DEVICE — PENCIL SMK EVAC 10 FT BLADE ELECTRD ROCKER FOR TELSCP

## (undated) DEVICE — SYRINGE 20ML LL S/C 50

## (undated) DEVICE — DRESSING STERILE PETRO W3XL8IN N ADH OIL EMUL GZ CURAD

## (undated) DEVICE — NEONATAL-ADULT SPO2 SENSOR: Brand: NELLCOR

## (undated) DEVICE — KIT IV STRT W CHLORAPREP PD 1ML

## (undated) DEVICE — GOWN,SIRUS,NONRNF,SETINSLV,2XL,18/CS: Brand: MEDLINE

## (undated) DEVICE — SYR 50ML SLIP TIP NSAF LF STRL --

## (undated) DEVICE — 2108 SERIES SAGITTAL BLADE AGGRESSIVE  (25.0 X 1.19 X 85.0MM)

## (undated) DEVICE — SUTURE VCRL SZ 2-0 L36IN ABSRB UD L40MM CT 1/2 CIR J957H

## (undated) DEVICE — FORCEPS BX L240CM JAW DIA2.2MM RAD JAW 4 HOT DISP

## (undated) DEVICE — SET ADMIN 16ML TBNG L100IN 2 Y INJ SITE IV PIGGY BK DISP

## (undated) DEVICE — SYRINGE MED 20ML STD CLR PLAS LUERLOCK TIP N CTRL DISP

## (undated) DEVICE — NEEDLE HYPO 18GA L1.5IN PNK S STL HUB POLYPR SHLD REG BVL

## (undated) DEVICE — FCPS BX HOT RJ4 2.2MMX240CM -- RADIAL JAW 4 BX/40

## (undated) DEVICE — QUILTED PREMIUM COMFORT UNDERPAD,EXTRA HEAVY: Brand: WINGS